# Patient Record
Sex: FEMALE | Race: WHITE | Employment: FULL TIME | URBAN - METROPOLITAN AREA
[De-identification: names, ages, dates, MRNs, and addresses within clinical notes are randomized per-mention and may not be internally consistent; named-entity substitution may affect disease eponyms.]

---

## 2019-09-07 ENCOUNTER — HOSPITAL ENCOUNTER (EMERGENCY)
Age: 39
Discharge: HOME | End: 2019-09-07
Attending: FAMILY MEDICINE
Payer: COMMERCIAL

## 2019-09-07 ENCOUNTER — APPOINTMENT (OUTPATIENT)
Dept: GENERAL RADIOLOGY | Age: 39
End: 2019-09-07
Payer: COMMERCIAL

## 2019-09-07 VITALS
TEMPERATURE: 98.2 F | WEIGHT: 170 LBS | BODY MASS INDEX: 26.68 KG/M2 | OXYGEN SATURATION: 98 % | HEIGHT: 67 IN | DIASTOLIC BLOOD PRESSURE: 62 MMHG | SYSTOLIC BLOOD PRESSURE: 104 MMHG | HEART RATE: 82 BPM | RESPIRATION RATE: 19 BRPM

## 2019-09-07 DIAGNOSIS — M54.12 CERVICAL RADICULITIS: Primary | ICD-10-CM

## 2019-09-07 LAB
ALBUMIN SERPL-MCNC: 4.4 G/DL (ref 3.5–4.6)
ALP BLD-CCNC: 60 U/L (ref 40–130)
ALT SERPL-CCNC: 15 U/L (ref 0–33)
ANION GAP SERPL CALCULATED.3IONS-SCNC: 7 MEQ/L (ref 9–15)
AST SERPL-CCNC: 12 U/L (ref 0–35)
BASOPHILS ABSOLUTE: 0.1 K/UL (ref 0–0.2)
BASOPHILS RELATIVE PERCENT: 0.9 %
BILIRUB SERPL-MCNC: 0.3 MG/DL (ref 0.2–0.7)
BUN BLDV-MCNC: 7 MG/DL (ref 6–20)
CALCIUM SERPL-MCNC: 8.7 MG/DL (ref 8.5–9.9)
CHLORIDE BLD-SCNC: 101 MEQ/L (ref 95–107)
CHOLESTEROL, TOTAL: 217 MG/DL (ref 0–199)
CO2: 29 MEQ/L (ref 20–31)
CREAT SERPL-MCNC: 0.64 MG/DL (ref 0.5–0.9)
EKG ATRIAL RATE: 76 BPM
EKG P AXIS: 58 DEGREES
EKG P-R INTERVAL: 160 MS
EKG Q-T INTERVAL: 396 MS
EKG QRS DURATION: 90 MS
EKG QTC CALCULATION (BAZETT): 445 MS
EKG R AXIS: 47 DEGREES
EKG T AXIS: 20 DEGREES
EKG VENTRICULAR RATE: 76 BPM
EOSINOPHILS ABSOLUTE: 0 K/UL (ref 0–0.7)
EOSINOPHILS RELATIVE PERCENT: 0.9 %
GFR AFRICAN AMERICAN: >60
GFR NON-AFRICAN AMERICAN: >60
GLOBULIN: 2.3 G/DL (ref 2.3–3.5)
GLUCOSE BLD-MCNC: 124 MG/DL (ref 70–99)
HBA1C MFR BLD: 5.7 % (ref 4.8–5.9)
HCT VFR BLD CALC: 37.2 % (ref 37–47)
HDLC SERPL-MCNC: 53 MG/DL (ref 40–59)
HEMOGLOBIN: 13.1 G/DL (ref 12–16)
LDL CHOLESTEROL CALCULATED: 143 MG/DL (ref 0–129)
LYMPHOCYTES ABSOLUTE: 1.7 K/UL (ref 1–4.8)
LYMPHOCYTES RELATIVE PERCENT: 29.8 %
MCH RBC QN AUTO: 29.5 PG (ref 27–31.3)
MCHC RBC AUTO-ENTMCNC: 35.2 % (ref 33–37)
MCV RBC AUTO: 83.8 FL (ref 82–100)
MONOCYTES ABSOLUTE: 0.5 K/UL (ref 0.2–0.8)
MONOCYTES RELATIVE PERCENT: 8.3 %
NEUTROPHILS ABSOLUTE: 3.3 K/UL (ref 1.4–6.5)
NEUTROPHILS RELATIVE PERCENT: 60.1 %
PDW BLD-RTO: 12.1 % (ref 11.5–14.5)
PLATELET # BLD: 208 K/UL (ref 130–400)
POTASSIUM SERPL-SCNC: 4.4 MEQ/L (ref 3.4–4.9)
PRO-BNP: 18 PG/ML
RBC # BLD: 4.44 M/UL (ref 4.2–5.4)
SODIUM BLD-SCNC: 137 MEQ/L (ref 135–144)
TOTAL CK: 63 U/L (ref 0–170)
TOTAL PROTEIN: 6.7 G/DL (ref 6.3–8)
TRIGL SERPL-MCNC: 103 MG/DL (ref 0–150)
TROPONIN: <0.01 NG/ML (ref 0–0.01)
WBC # BLD: 5.6 K/UL (ref 4.8–10.8)

## 2019-09-07 PROCEDURE — 80053 COMPREHEN METABOLIC PANEL: CPT

## 2019-09-07 PROCEDURE — 83880 ASSAY OF NATRIURETIC PEPTIDE: CPT

## 2019-09-07 PROCEDURE — 6360000002 HC RX W HCPCS: Performed by: FAMILY MEDICINE

## 2019-09-07 PROCEDURE — 96372 THER/PROPH/DIAG INJ SC/IM: CPT

## 2019-09-07 PROCEDURE — 85025 COMPLETE CBC W/AUTO DIFF WBC: CPT

## 2019-09-07 PROCEDURE — 71045 X-RAY EXAM CHEST 1 VIEW: CPT

## 2019-09-07 PROCEDURE — 82550 ASSAY OF CK (CPK): CPT

## 2019-09-07 PROCEDURE — 84484 ASSAY OF TROPONIN QUANT: CPT

## 2019-09-07 PROCEDURE — 99285 EMERGENCY DEPT VISIT HI MDM: CPT

## 2019-09-07 PROCEDURE — 80061 LIPID PANEL: CPT

## 2019-09-07 PROCEDURE — 83036 HEMOGLOBIN GLYCOSYLATED A1C: CPT

## 2019-09-07 PROCEDURE — 93005 ELECTROCARDIOGRAM TRACING: CPT | Performed by: FAMILY MEDICINE

## 2019-09-07 PROCEDURE — 36415 COLL VENOUS BLD VENIPUNCTURE: CPT

## 2019-09-07 RX ORDER — ORPHENADRINE CITRATE 30 MG/ML
60 INJECTION INTRAMUSCULAR; INTRAVENOUS ONCE
Status: COMPLETED | OUTPATIENT
Start: 2019-09-07 | End: 2019-09-07

## 2019-09-07 RX ORDER — TIZANIDINE 4 MG/1
4 TABLET ORAL EVERY 8 HOURS PRN
Qty: 10 TABLET | Refills: 0 | Status: SHIPPED | OUTPATIENT
Start: 2019-09-07 | End: 2019-10-07

## 2019-09-07 RX ORDER — KETOROLAC TROMETHAMINE 30 MG/ML
60 INJECTION, SOLUTION INTRAMUSCULAR; INTRAVENOUS ONCE
Status: COMPLETED | OUTPATIENT
Start: 2019-09-07 | End: 2019-09-07

## 2019-09-07 RX ORDER — NAPROXEN 500 MG/1
500 TABLET ORAL 2 TIMES DAILY
Qty: 60 TABLET | Refills: 0 | Status: SHIPPED | OUTPATIENT
Start: 2019-09-07 | End: 2020-03-11 | Stop reason: SDUPTHER

## 2019-09-07 RX ADMIN — ORPHENADRINE CITRATE 60 MG: 30 INJECTION INTRAMUSCULAR; INTRAVENOUS at 21:08

## 2019-09-07 RX ADMIN — KETOROLAC TROMETHAMINE 60 MG: 30 INJECTION, SOLUTION INTRAMUSCULAR; INTRAVENOUS at 21:07

## 2019-09-07 ASSESSMENT — PAIN DESCRIPTION - LOCATION: LOCATION: ARM

## 2019-09-07 ASSESSMENT — PAIN SCALES - GENERAL
PAINLEVEL_OUTOF10: 8
PAINLEVEL_OUTOF10: 8

## 2019-09-07 ASSESSMENT — PAIN DESCRIPTION - PAIN TYPE: TYPE: ACUTE PAIN

## 2019-09-07 ASSESSMENT — PAIN DESCRIPTION - ORIENTATION: ORIENTATION: LEFT

## 2019-09-08 ASSESSMENT — ENCOUNTER SYMPTOMS
BACK PAIN: 0
SHORTNESS OF BREATH: 0
NAUSEA: 0
VOMITING: 0
COUGH: 0
ABDOMINAL PAIN: 0
ORTHOPNEA: 0
TROUBLE SWALLOWING: 0
HEARTBURN: 0
RESPIRATORY NEGATIVE: 1

## 2019-09-08 ASSESSMENT — HEART SCORE: ECG: 0

## 2019-09-08 NOTE — ED NOTES
Iv catheter removed by this tech because patient is being discharged home. Catheter intact, no complications, clean dressing applied.       Cheikh Chung  09/07/19 2056

## 2019-09-09 PROCEDURE — 93010 ELECTROCARDIOGRAM REPORT: CPT | Performed by: INTERNAL MEDICINE

## 2020-01-22 ENCOUNTER — OFFICE VISIT (OUTPATIENT)
Dept: FAMILY MEDICINE CLINIC | Age: 40
End: 2020-01-22
Payer: COMMERCIAL

## 2020-01-22 VITALS
OXYGEN SATURATION: 97 % | DIASTOLIC BLOOD PRESSURE: 78 MMHG | HEART RATE: 98 BPM | WEIGHT: 172.4 LBS | SYSTOLIC BLOOD PRESSURE: 112 MMHG | BODY MASS INDEX: 27 KG/M2 | TEMPERATURE: 97.2 F

## 2020-01-22 PROCEDURE — 99204 OFFICE O/P NEW MOD 45 MIN: CPT | Performed by: INTERNAL MEDICINE

## 2020-01-22 RX ORDER — BUPROPION HYDROCHLORIDE 150 MG/1
150 TABLET ORAL EVERY MORNING
COMMUNITY
Start: 2019-10-19

## 2020-01-22 RX ORDER — ALPRAZOLAM 0.5 MG/1
0.5 TABLET ORAL 4 TIMES DAILY PRN
Qty: 28 TABLET | Refills: 0 | Status: SHIPPED | OUTPATIENT
Start: 2020-01-22 | End: 2020-03-11 | Stop reason: SDUPTHER

## 2020-01-22 RX ORDER — DEXTROAMPHETAMINE SACCHARATE, AMPHETAMINE ASPARTATE, DEXTROAMPHETAMINE SULFATE AND AMPHETAMINE SULFATE 7.5; 7.5; 7.5; 7.5 MG/1; MG/1; MG/1; MG/1
30 TABLET ORAL 2 TIMES DAILY
Qty: 30 TABLET | Refills: 0 | Status: SHIPPED | OUTPATIENT
Start: 2020-01-22 | End: 2020-03-03 | Stop reason: SDUPTHER

## 2020-01-22 RX ORDER — DEXTROAMPHETAMINE SACCHARATE, AMPHETAMINE ASPARTATE, DEXTROAMPHETAMINE SULFATE AND AMPHETAMINE SULFATE 7.5; 7.5; 7.5; 7.5 MG/1; MG/1; MG/1; MG/1
30 TABLET ORAL DAILY
Qty: 30 TABLET | Refills: 0 | Status: SHIPPED | OUTPATIENT
Start: 2020-01-22 | End: 2020-01-22

## 2020-01-22 RX ORDER — DEXTROAMPHETAMINE SACCHARATE, AMPHETAMINE ASPARTATE MONOHYDRATE, DEXTROAMPHETAMINE SULFATE AND AMPHETAMINE SULFATE 7.5; 7.5; 7.5; 7.5 MG/1; MG/1; MG/1; MG/1
30 CAPSULE, EXTENDED RELEASE ORAL DAILY
COMMUNITY
Start: 2019-11-06 | End: 2020-01-22

## 2020-01-22 RX ORDER — LAMOTRIGINE 150 MG/1
150 TABLET ORAL 3 TIMES DAILY
COMMUNITY
Start: 2019-12-13 | End: 2020-03-11 | Stop reason: SDUPTHER

## 2020-01-22 RX ORDER — OMEPRAZOLE 40 MG/1
40 CAPSULE, DELAYED RELEASE ORAL DAILY
Qty: 30 CAPSULE | Refills: 3 | Status: SHIPPED | OUTPATIENT
Start: 2020-01-22 | End: 2020-02-24 | Stop reason: SDUPTHER

## 2020-01-22 RX ORDER — ALPRAZOLAM 0.5 MG/1
0.5 TABLET ORAL 4 TIMES DAILY PRN
COMMUNITY
Start: 2019-12-31 | End: 2020-01-22 | Stop reason: SDUPTHER

## 2020-01-22 ASSESSMENT — PATIENT HEALTH QUESTIONNAIRE - PHQ9
2. FEELING DOWN, DEPRESSED OR HOPELESS: 0
SUM OF ALL RESPONSES TO PHQ QUESTIONS 1-9: 1
1. LITTLE INTEREST OR PLEASURE IN DOING THINGS: 1
SUM OF ALL RESPONSES TO PHQ QUESTIONS 1-9: 1
SUM OF ALL RESPONSES TO PHQ9 QUESTIONS 1 & 2: 1

## 2020-01-22 ASSESSMENT — ENCOUNTER SYMPTOMS
RHINORRHEA: 0
BACK PAIN: 0
BLOOD IN STOOL: 0
ABDOMINAL PAIN: 0
VOMITING: 0
SINUS PRESSURE: 0
FACIAL SWELLING: 0
APNEA: 0
EYE ITCHING: 0
ABDOMINAL DISTENTION: 0
NAUSEA: 0
COUGH: 0
WHEEZING: 0
EYE PAIN: 0
CHEST TIGHTNESS: 0
PHOTOPHOBIA: 0
CONSTIPATION: 0
COLOR CHANGE: 0
RECTAL PAIN: 0
DIARRHEA: 0
EYE DISCHARGE: 0
TROUBLE SWALLOWING: 0
SHORTNESS OF BREATH: 0
SINUS PAIN: 0
SORE THROAT: 0
EYE REDNESS: 0
VOICE CHANGE: 0

## 2020-01-22 NOTE — PROGRESS NOTES
2020    Harley Lynn (:  1980) is a 37 y.o. female, here for evaluation of the following medical concerns:    HPI  24-year-old female with a history of bipolar disorder, attention deficit disorder, GERD and anxiety presents to establish continuity with me as her primary care doctor. Attention deficit disorder: The patient has received Adderall 30 mg twice daily for several years and this is controlled her attention deficit disorder symptoms. Bipolar disorder: The patient is compliant  Lamictal.  Her symptoms are well controlled. GERD: The patient receives Prilosec 40 mg orally daily. Her symptoms are well controlled. Anxiety: As a forementioned the patient receives Wellbutrin and her symptoms are well controlled at this time. At present he denies polyuria,  Polydipsia, constitutional, sinus, visual, cardiopulmonary, urologic, gastrointestinal, immunologic/hematologic, musculoskeletal, neurologic,dermatologic, or psychiatric complaints. Review of Systems   Constitutional: Negative for chills, diaphoresis, fatigue and fever. HENT: Negative for congestion, dental problem, drooling, ear discharge, ear pain, facial swelling, hearing loss, mouth sores, nosebleeds, postnasal drip, rhinorrhea, sinus pressure, sinus pain, sneezing, sore throat, tinnitus, trouble swallowing and voice change. Eyes: Negative for photophobia, pain, discharge, redness, itching and visual disturbance. Respiratory: Negative for apnea, cough, chest tightness, shortness of breath and wheezing. Cardiovascular: Negative for chest pain, palpitations and leg swelling. Gastrointestinal: Negative for abdominal distention, abdominal pain, blood in stool, constipation, diarrhea, nausea, rectal pain and vomiting. Endocrine: Negative for cold intolerance, heat intolerance, polydipsia, polyphagia and polyuria.    Genitourinary: Negative for decreased urine volume, difficulty urinating, dysuria, flank pain, frequency, genital sores, hematuria and urgency. Musculoskeletal: Negative for arthralgias, back pain, gait problem, joint swelling, myalgias, neck pain and neck stiffness. Skin: Negative for color change, rash and wound. Allergic/Immunologic: Negative for environmental allergies and food allergies. Neurological: Negative for dizziness, tremors, seizures, syncope, facial asymmetry, speech difficulty, weakness, light-headedness, numbness and headaches. Hematological: Negative for adenopathy. Does not bruise/bleed easily. Psychiatric/Behavioral: Negative for agitation, confusion, decreased concentration, hallucinations, self-injury, sleep disturbance and suicidal ideas. The patient is not nervous/anxious. Prior to Visit Medications    Medication Sig Taking? Authorizing Provider   amphetamine-dextroamphetamine (ADDERALL XR) 30 MG extended release capsule Take 30 mg by mouth daily. Yes Historical Provider, MD   buPROPion (WELLBUTRIN XL) 150 MG extended release tablet Take 150 mg by mouth every morning  Yes Historical Provider, MD   lamoTRIgine (LAMICTAL) 150 MG tablet Take 150 mg by mouth 3 times daily Yes Historical Provider, MD   amphetamine-dextroamphetamine (ADDERALL, 30MG,) 30 MG tablet Take 1 tablet by mouth 2 times daily for 30 days. Yes Luh Herring MD   ALPRAZolam Shelba Moors) 0.5 MG tablet Take 1 tablet by mouth 4 times daily as needed for Anxiety for up to 7 days. Yes Luh Herring MD   omeprazole (PRILOSEC) 40 MG delayed release capsule Take 1 capsule by mouth daily Yes Luh Herring MD   naproxen (NAPROSYN) 500 MG tablet Take 1 tablet by mouth 2 times daily Yes Trinity Billy MD        No Known Allergies    Past Medical History:   Diagnosis Date    Anxiety     Bipolar 1 disorder ([facility]     Diabetes mellitus ([facility]     HSV-1 (herpes simplex virus 1) infection     Human papilloma virus     Hyperlipidemia        History reviewed. No pertinent surgical history.     Social General: She is not in acute distress. Appearance: She is well-developed. HENT:      Head: Normocephalic. Right Ear: External ear normal.      Left Ear: External ear normal.   Eyes:      Conjunctiva/sclera: Conjunctivae normal.      Pupils: Pupils are equal, round, and reactive to light. Neck:      Musculoskeletal: Neck supple. Vascular: No JVD. Trachea: No tracheal deviation. Cardiovascular:      Rate and Rhythm: Normal rate and regular rhythm. Heart sounds: Normal heart sounds. Pulmonary:      Effort: Pulmonary effort is normal. No respiratory distress. Breath sounds: Normal breath sounds. No wheezing or rales. Chest:      Chest wall: No tenderness. Abdominal:      General: Bowel sounds are normal. There is no distension. Palpations: Abdomen is soft. There is no mass. Tenderness: There is no tenderness. There is no guarding or rebound. Musculoskeletal:         General: No tenderness or deformity. Lymphadenopathy:      Cervical: No cervical adenopathy. Skin:     General: Skin is warm and dry. Capillary Refill: Capillary refill takes 2 to 3 seconds. Coloration: Skin is not pale. Findings: No erythema or rash. Neurological:      Mental Status: She is alert and oriented to person, place, and time. Motor: No abnormal muscle tone. Psychiatric:         Thought Content: Thought content normal.         Judgment: Judgment normal.         ASSESSMENT/PLAN:  1. Attention deficit hyperactivity disorder (ADHD), unspecified ADHD type    - amphetamine-dextroamphetamine (ADDERALL, 30MG,) 30 MG tablet; Take 1 tablet by mouth 2 times daily for 30 days. Dispense: 30 tablet; Refill: 0  - ALPRAZolam (XANAX) 0.5 MG tablet; Take 1 tablet by mouth 4 times daily as needed for Anxiety for up to 7 days. Dispense: 28 tablet; Refill: 0          2.  Type 2 diabetes mellitus with other specified complication, without long-term current use of insulin (HCC)  No interventions at this time. Reviewed metformin as an option to treat the patient's diabetes. She is concerned regarding possible side effects. Hemoglobin A1c was 5.7 on September 7, 2019. Will await patient's decision regarding the initiation of metformin. .          Lona Eddy MD, [place]      Return in about 2 weeks (around 2/5/2020). An  electronic signature was used to authenticate this note.     --Esha Rojas MD on 1/22/2020 at 3:23 PM

## 2020-02-04 ENCOUNTER — PATIENT MESSAGE (OUTPATIENT)
Dept: FAMILY MEDICINE CLINIC | Age: 40
End: 2020-02-04

## 2020-02-15 ENCOUNTER — HOSPITAL ENCOUNTER (EMERGENCY)
Age: 40
Discharge: HOME | End: 2020-02-15
Attending: EMERGENCY MEDICINE
Payer: COMMERCIAL

## 2020-02-15 VITALS
RESPIRATION RATE: 18 BRPM | TEMPERATURE: 97.4 F | HEART RATE: 104 BPM | WEIGHT: 170 LBS | SYSTOLIC BLOOD PRESSURE: 119 MMHG | DIASTOLIC BLOOD PRESSURE: 81 MMHG | BODY MASS INDEX: 25.76 KG/M2 | HEIGHT: 68 IN | OXYGEN SATURATION: 100 %

## 2020-02-15 LAB
ALBUMIN SERPL-MCNC: 4.1 G/DL (ref 3.5–4.6)
ALP BLD-CCNC: 67 U/L (ref 40–130)
ALT SERPL-CCNC: 16 U/L (ref 0–33)
AMPHETAMINE SCREEN, URINE: NORMAL
ANION GAP SERPL CALCULATED.3IONS-SCNC: 14 MEQ/L (ref 9–15)
AST SERPL-CCNC: 13 U/L (ref 0–35)
BARBITURATE SCREEN URINE: NORMAL
BASOPHILS ABSOLUTE: 0 K/UL (ref 0–0.2)
BASOPHILS RELATIVE PERCENT: 0.7 %
BENZODIAZEPINE SCREEN, URINE: NORMAL
BILIRUB SERPL-MCNC: <0.2 MG/DL (ref 0.2–0.7)
BILIRUBIN URINE: NEGATIVE
BLOOD, URINE: NEGATIVE
BUN BLDV-MCNC: 10 MG/DL (ref 6–20)
CALCIUM SERPL-MCNC: 8.9 MG/DL (ref 8.5–9.9)
CANNABINOID SCREEN URINE: NORMAL
CARBAMAZEPINE LEVEL: <2 UG/ML (ref 4–10)
CHLORIDE BLD-SCNC: 93 MEQ/L (ref 95–107)
CLARITY: CLEAR
CO2: 25 MEQ/L (ref 20–31)
COCAINE METABOLITE SCREEN URINE: NORMAL
COLOR: YELLOW
CREAT SERPL-MCNC: 0.69 MG/DL (ref 0.5–0.9)
EOSINOPHILS ABSOLUTE: 0.1 K/UL (ref 0–0.7)
EOSINOPHILS RELATIVE PERCENT: 1.6 %
ETHANOL PERCENT: NORMAL G/DL
ETHANOL: <10 MG/DL (ref 0–0.08)
GFR AFRICAN AMERICAN: >60
GFR NON-AFRICAN AMERICAN: >60
GLOBULIN: 1.9 G/DL (ref 2.3–3.5)
GLUCOSE BLD-MCNC: 170 MG/DL (ref 70–99)
GLUCOSE URINE: 100 MG/DL
HCG(URINE) PREGNANCY TEST: NEGATIVE
HCT VFR BLD CALC: 35.8 % (ref 37–47)
HEMOGLOBIN: 12.8 G/DL (ref 12–16)
KETONES, URINE: NEGATIVE MG/DL
LEUKOCYTE ESTERASE, URINE: NEGATIVE
LYMPHOCYTES ABSOLUTE: 2.3 K/UL (ref 1–4.8)
LYMPHOCYTES RELATIVE PERCENT: 33.1 %
Lab: NORMAL
MCH RBC QN AUTO: 29.8 PG (ref 27–31.3)
MCHC RBC AUTO-ENTMCNC: 35.9 % (ref 33–37)
MCV RBC AUTO: 83.1 FL (ref 82–100)
METHADONE SCREEN, URINE: NORMAL
MONOCYTES ABSOLUTE: 0.5 K/UL (ref 0.2–0.8)
MONOCYTES RELATIVE PERCENT: 7.9 %
NEUTROPHILS ABSOLUTE: 3.9 K/UL (ref 1.4–6.5)
NEUTROPHILS RELATIVE PERCENT: 56.7 %
NITRITE, URINE: NEGATIVE
OPIATE SCREEN URINE: NORMAL
OXYCODONE URINE: NORMAL
PDW BLD-RTO: 12.1 % (ref 11.5–14.5)
PH UA: 6.5 (ref 5–9)
PHENCYCLIDINE SCREEN URINE: NORMAL
PLATELET # BLD: 211 K/UL (ref 130–400)
POTASSIUM SERPL-SCNC: 3.7 MEQ/L (ref 3.4–4.9)
PROPOXYPHENE SCREEN: NORMAL
PROTEIN UA: NEGATIVE MG/DL
RBC # BLD: 4.3 M/UL (ref 4.2–5.4)
SALICYLATE, SERUM: <0.3 MG/DL (ref 15–30)
SODIUM BLD-SCNC: 132 MEQ/L (ref 135–144)
SPECIFIC GRAVITY UA: 1 (ref 1–1.03)
TOTAL CK: 71 U/L (ref 0–170)
TOTAL PROTEIN: 6 G/DL (ref 6.3–8)
TSH SERPL DL<=0.05 MIU/L-ACNC: 1.69 UIU/ML (ref 0.44–3.86)
URINE REFLEX TO CULTURE: ABNORMAL
UROBILINOGEN, URINE: 0.2 E.U./DL
WBC # BLD: 6.9 K/UL (ref 4.8–10.8)

## 2020-02-15 PROCEDURE — 99283 EMERGENCY DEPT VISIT LOW MDM: CPT

## 2020-02-15 PROCEDURE — 80156 ASSAY CARBAMAZEPINE TOTAL: CPT

## 2020-02-15 PROCEDURE — G0480 DRUG TEST DEF 1-7 CLASSES: HCPCS

## 2020-02-15 PROCEDURE — 84443 ASSAY THYROID STIM HORMONE: CPT

## 2020-02-15 PROCEDURE — 6370000000 HC RX 637 (ALT 250 FOR IP): Performed by: EMERGENCY MEDICINE

## 2020-02-15 PROCEDURE — 82550 ASSAY OF CK (CPK): CPT

## 2020-02-15 PROCEDURE — 36415 COLL VENOUS BLD VENIPUNCTURE: CPT

## 2020-02-15 PROCEDURE — 85025 COMPLETE CBC W/AUTO DIFF WBC: CPT

## 2020-02-15 PROCEDURE — 80053 COMPREHEN METABOLIC PANEL: CPT

## 2020-02-15 PROCEDURE — 84703 CHORIONIC GONADOTROPIN ASSAY: CPT

## 2020-02-15 PROCEDURE — 81003 URINALYSIS AUTO W/O SCOPE: CPT

## 2020-02-15 PROCEDURE — 80307 DRUG TEST PRSMV CHEM ANLYZR: CPT

## 2020-02-15 RX ORDER — QUETIAPINE 200 MG/1
200 TABLET, FILM COATED, EXTENDED RELEASE ORAL DAILY
COMMUNITY
End: 2020-03-11

## 2020-02-15 RX ORDER — ALPRAZOLAM 0.5 MG/1
1 TABLET ORAL ONCE
Status: COMPLETED | OUTPATIENT
Start: 2020-02-15 | End: 2020-02-15

## 2020-02-15 RX ORDER — ALPRAZOLAM 0.5 MG/1
0.5 TABLET ORAL 4 TIMES DAILY PRN
COMMUNITY
End: 2020-03-03 | Stop reason: SDUPTHER

## 2020-02-15 RX ORDER — QUETIAPINE FUMARATE 25 MG/1
200 TABLET, FILM COATED ORAL DAILY
COMMUNITY
End: 2020-02-15

## 2020-02-15 RX ADMIN — ALPRAZOLAM 1 MG: 0.5 TABLET ORAL at 11:03

## 2020-02-15 ASSESSMENT — ENCOUNTER SYMPTOMS
PHOTOPHOBIA: 0
RHINORRHEA: 0
SHORTNESS OF BREATH: 0
EYE DISCHARGE: 0
CONSTIPATION: 0
FACIAL SWELLING: 0
BLOOD IN STOOL: 0
EYE PAIN: 0
WHEEZING: 0
TROUBLE SWALLOWING: 0
BACK PAIN: 0
COLOR CHANGE: 0
COUGH: 0
VOMITING: 0
DIARRHEA: 0
STRIDOR: 0
SINUS PRESSURE: 0
SORE THROAT: 0
CHEST TIGHTNESS: 0
EYE ITCHING: 0
CHOKING: 0
ANAL BLEEDING: 0
ABDOMINAL PAIN: 0
EYE REDNESS: 0
NAUSEA: 0
ABDOMINAL DISTENTION: 0
VOICE CHANGE: 0

## 2020-02-15 NOTE — ED NOTES
Collateral:patient was brought in by Brandie Schultz who gave the following collateral information. He and Venancio Logan moved back to his hometown of [place] 6 months ago from [place]. Pt has had two previous issues with Laura and was hospitalized for at least one of those episodes(the first) which occurred in 2008. Edyta has not seen a major episode in the Past. Patient was to be seen Dr Betty Medina for her first meeting earlier this week (moved up due to patient condition) and pt refused to attend. Patient works as a  and has been under additional stress. When this occurred in the past also triggered those two afore mentioned major manic episodes. She has been posting odd statements on Linked in and on text, and making odd statements in her place of business per Yuri Ruffin. She has been making statements such as , \" a  sent me an E-mail of  how happy he is with me, He is going to make me \" and a \" million dollars is going to be deposited into our account: and 'the  is trying to reach me to see what kind of jet I would like\". Edyta states she has been resistant to coming into the ER until this AM, because yesterday I came home and there was poop on the floor,she said she past out while on th toilet. [facility] [place], [address] [place] [address]  02/15/20 200 [facility], [address] [place] [address]  02/15/20 1722

## 2020-02-15 NOTE — ED NOTES
Spoke with Lab . They are aware that tegretol level was discontinued in computer even before drawn and should not have been run. They state they see the same and note will be put in to credit the account on Monday. Charlotte Bertrand  [facility]  02/15/20 [id number]

## 2020-02-15 NOTE — ED TRIAGE NOTES
Patient recently moved from [place]. Where she was being treated with Seroquel 25 mg. Began to exhibit symptoms of Laura. [place] psychiatrist notified.  He called in per triage nurse, a prescription of seroquel for 200 mg which she took this AM.

## 2020-02-15 NOTE — ED NOTES
All personal belongings returned to patient. Pt verbalized understanding. ambulated off the unit with steady gait. Varun Payne  02/15/20 2942

## 2020-02-15 NOTE — ED PROVIDER NOTES
QUETIAPINE (SEROQUEL XR) 200 MG EXTENDED RELEASE TABLET    Take 200 mg by mouth daily daily       ALLERGIES     Patient has no known allergies. FAMILY HISTORY       Family History   Problem Relation Age of Onset    Diabetes Mother     Diabetes Father     Ulcerative Colitis Brother     Bipolar Disorder Maternal Grandmother           SOCIAL HISTORY       Social History     Socioeconomic History    Marital status: Single     Spouse name: None    Number of children: None    Years of education: None    Highest education level: None   Occupational History    None   Social Needs    Financial resource strain: None    Food insecurity:     Worry: None     Inability: None    Transportation needs:     Medical: None     Non-medical: None   Tobacco Use    Smoking status: Never Smoker    Smokeless tobacco: Never Used   Substance and Sexual Activity    Alcohol use: Yes     Comment: drinks wine    Drug use: Yes     Types: Marijuana    Sexual activity: Never   Lifestyle    Physical activity:     Days per week: None     Minutes per session: None    Stress: None   Relationships    Social connections:     Talks on phone: None     Gets together: None     Attends Moravian service: None     Active member of club or organization: None     Attends meetings of clubs or organizations: None     Relationship status: None    Intimate partner violence:     Fear of current or ex partner: None     Emotionally abused: None     Physically abused: None     Forced sexual activity: None   Other Topics Concern    None   Social History Narrative    None       SCREENINGS             PHYSICAL EXAM    (up to 7 for level 4, 8 or more for level 5)     ED Triage Vitals [02/15/20 0829]   BP Temp Temp Source Pulse Resp SpO2 Height Weight   119/81 97.4 °F (36.3 °C) Oral 116 18 100 % 5' 8\" (1.727 m) 170 lb (77.1 kg)       Physical Exam  Vitals signs and nursing note reviewed. Constitutional:       General: She is not in acute distress. medically cleared for psychiatric evaluation. Patient states she is not suicidal to myself and to the [facility]. After medication, the patient states she feels much better and wants to go home she was discharged to the care of a sober adult male    [facility]      CRITICAL CARE TIME     CONSULTS:  None    PROCEDURES:  Unless otherwise noted below, none     Procedures    FINAL IMPRESSION      1. Adjustment disorder, unspecified type          DISPOSITION/PLAN   DISPOSITION Decision To Discharge 02/15/2020 12:57:28 PM      PATIENT REFERRED TO:  Nataly Begum MD  [address] [place] [phone]    Go in 2 days  For follow up. Return if worse in any way.       DISCHARGE MEDICATIONS:  New Prescriptions    No medications on file          (Please note that portions of this note were completed with a voice recognition program.  Efforts were made to edit the dictations but occasionally words are mis-transcribed.)    Christine Dailey MD (electronically signed)  Attending Emergency Physician          Christine Dailey MD  02/15/20 1300

## 2020-02-15 NOTE — ED NOTES
Provisional Diagnosis: bipolar by hx, R/o benzo/amphetamine withdrawal      Psychosocial and Contextual Factors:  Pt moved from [place] to [place] with vish about 6 months ago. She is employed with a [employer] and has been under added stress at work, She has been having increased stress at work. 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       C-SSRS Summary:     Patient: C-SSRS Suicide Screening  1) Within the past month, have you wished you were dead or wished you could go to sleep and not wake up? : No  2) Have you actually had any thoughts of killing yourself? : No  6) Have you ever done anything, started to do anything, or prepared to do anything to end your life?: No    Family: Sherry Tavares is concerned and feels she needs to be seen for an outpatient appointment but denies the need for inpatient. He is adament she has been taking all of her medications as ordred. Agency: Has an upcoming appointment with Dr Severo Spotted. Abuse Assessment  Physical Abuse: Denies  Verbal Abuse: Denies  Emotional abuse: Denies  Financial Abuse: Denies  Sexual abuse: Denies    Clinical Summary:  Pt brought in by vish for above noted reasons( see psychosocial as well as collateral note of [phone] [id number]). Pt is disorganized in thought process and is able to describe this as , \" my brain feels cluttered\".   She is not able to see however that this may be able to cause her to be delusional. \" no, it just makes me not able to express what is happening in a way that it looks delusional because this would make my lifestyle more like his (the bank presidents) not like mine is and it is so different and privilege that no one understand and thinks I am delusional. Pt statements however are not cohesive and responses to the same question being asked  More than once often differed. She is oriented to person place and time. SHe denies thougths of self harm now and in the past. She denies thoughts of harming others. She denies presence of hallucinations and no signs of same are noted. Per Dr Dr Carlos Manuel Hansen.  Annalise Yang  02/15/20 4707

## 2020-02-15 NOTE — ED NOTES
Pt is requesting to be discharged and to f/u with Dr Onel Cox. Fiance is also insistent that pt is taking meds as ordered and is safe for discharged. Pt is not meeting criteria for pink sheet. Dr Leon Shelton states understanding an agrees. pt to be discharged. Boyfriend is in route. Janelle Manzano  02/15/20 1716

## 2020-02-17 ENCOUNTER — PATIENT MESSAGE (OUTPATIENT)
Dept: FAMILY MEDICINE CLINIC | Age: 40
End: 2020-02-17

## 2020-02-24 RX ORDER — OMEPRAZOLE 40 MG/1
40 CAPSULE, DELAYED RELEASE ORAL DAILY
Qty: 30 CAPSULE | Refills: 3 | Status: SHIPPED | OUTPATIENT
Start: 2020-02-24 | End: 2022-08-11 | Stop reason: ALTCHOICE

## 2020-02-24 NOTE — TELEPHONE ENCOUNTER
Pharmacy is requesting medication refill. Please approve or deny this request.    Rx requested:  Requested Prescriptions     Pending Prescriptions Disp Refills    omeprazole (PRILOSEC) 40 MG delayed release capsule 30 capsule 3     Sig: Take 1 capsule by mouth daily         Last Office Visit:   1/22/2020      Next Visit Date:  No future appointments.

## 2020-03-03 ENCOUNTER — OFFICE VISIT (OUTPATIENT)
Dept: FAMILY MEDICINE CLINIC | Age: 40
End: 2020-03-03
Payer: COMMERCIAL

## 2020-03-03 VITALS
DIASTOLIC BLOOD PRESSURE: 72 MMHG | TEMPERATURE: 96.2 F | BODY MASS INDEX: 26.24 KG/M2 | WEIGHT: 172.6 LBS | OXYGEN SATURATION: 96 % | SYSTOLIC BLOOD PRESSURE: 108 MMHG | HEART RATE: 104 BPM

## 2020-03-03 PROCEDURE — 99213 OFFICE O/P EST LOW 20 MIN: CPT | Performed by: INTERNAL MEDICINE

## 2020-03-03 RX ORDER — DEXTROAMPHETAMINE SACCHARATE, AMPHETAMINE ASPARTATE, DEXTROAMPHETAMINE SULFATE AND AMPHETAMINE SULFATE 7.5; 7.5; 7.5; 7.5 MG/1; MG/1; MG/1; MG/1
30 TABLET ORAL 2 TIMES DAILY
Qty: 30 TABLET | Refills: 0 | Status: SHIPPED | OUTPATIENT
Start: 2020-03-03 | End: 2020-04-27 | Stop reason: SDUPTHER

## 2020-03-03 RX ORDER — VALACYCLOVIR HYDROCHLORIDE 1 G/1
1000 TABLET, FILM COATED ORAL 3 TIMES DAILY
Qty: 21 TABLET | Refills: 0 | Status: SHIPPED | OUTPATIENT
Start: 2020-03-03 | End: 2020-12-22

## 2020-03-03 RX ORDER — ALPRAZOLAM 0.5 MG/1
0.5 TABLET ORAL 4 TIMES DAILY PRN
Qty: 28 TABLET | Refills: 0 | Status: SHIPPED | OUTPATIENT
Start: 2020-03-03 | End: 2020-03-10

## 2020-03-03 ASSESSMENT — ENCOUNTER SYMPTOMS
RHINORRHEA: 0
DIARRHEA: 0
VOICE CHANGE: 0
SHORTNESS OF BREATH: 0
NAUSEA: 0
BLOOD IN STOOL: 0
SINUS PAIN: 0
PHOTOPHOBIA: 0
ABDOMINAL DISTENTION: 0
EYE PAIN: 0
CHEST TIGHTNESS: 0
EYE DISCHARGE: 0
SORE THROAT: 0
VOMITING: 0
FACIAL SWELLING: 0
COUGH: 0
WHEEZING: 0
RECTAL PAIN: 0
BACK PAIN: 0
CONSTIPATION: 0
TROUBLE SWALLOWING: 0
APNEA: 0
ABDOMINAL PAIN: 0
SINUS PRESSURE: 0
EYE REDNESS: 0
COLOR CHANGE: 0
EYE ITCHING: 0

## 2020-03-03 NOTE — PROGRESS NOTES
96.2 °F (35.7 °C)   TempSrc: Temporal   SpO2: 96%   Weight: 172 lb 9.6 oz (78.3 kg)     Estimated body mass index is 26.24 kg/m² as calculated from the following:    Height as of 2/15/20: 5' 8\" (1.727 m). Weight as of this encounter: 172 lb 9.6 oz (78.3 kg). Physical Exam  Constitutional:       General: She is not in acute distress. Appearance: She is well-developed. HENT:      Head: Normocephalic. Right Ear: External ear normal.      Left Ear: External ear normal.   Eyes:      Conjunctiva/sclera: Conjunctivae normal.      Pupils: Pupils are equal, round, and reactive to light. Neck:      Musculoskeletal: Neck supple. Vascular: No JVD. Trachea: No tracheal deviation. Cardiovascular:      Rate and Rhythm: Normal rate and regular rhythm. Heart sounds: Normal heart sounds. Pulmonary:      Effort: Pulmonary effort is normal. No respiratory distress. Breath sounds: Normal breath sounds. No wheezing or rales. Chest:      Chest wall: No tenderness. Abdominal:      General: Bowel sounds are normal. There is no distension. Palpations: Abdomen is soft. There is no mass. Tenderness: There is no abdominal tenderness. There is no guarding or rebound. Musculoskeletal:         General: No tenderness or deformity. Lymphadenopathy:      Cervical: No cervical adenopathy. Skin:     General: Skin is warm and dry. Capillary Refill: Capillary refill takes 2 to 3 seconds. Coloration: Skin is not pale. Findings: No erythema or rash. Neurological:      Mental Status: She is alert and oriented to person, place, and time. Motor: No abnormal muscle tone. Psychiatric:         Thought Content: Thought content normal.         Judgment: Judgment normal.         ASSESSMENT/PLAN:     Attention deficit hyperactivity disorder (ADHD), unspecified ADHD type    Continue Adderall.   Oars report reviewed      Type 2 diabetes mellitus with other specified complication, without long-term current use of insulin (HCC)  No interventions at this time. Reviewed metformin as an option to treat the patient's diabetes in the past.        Anxiety  I have referred the patient to psychiatry in the past.  The patient reports that she is looking for a psychiatrist.  Continue alprazolam Wellbutrin and Seroquel. No follow-ups on file. An  electronic signature was used to authenticate this note.     --Lady Marcelino MD on 3/3/2020 at 8:32 PM

## 2020-03-04 ENCOUNTER — TELEPHONE (OUTPATIENT)
Dept: FAMILY MEDICINE CLINIC | Age: 40
End: 2020-03-04

## 2020-03-11 ENCOUNTER — OFFICE VISIT (OUTPATIENT)
Dept: FAMILY MEDICINE CLINIC | Age: 40
End: 2020-03-11
Payer: COMMERCIAL

## 2020-03-11 VITALS
SYSTOLIC BLOOD PRESSURE: 112 MMHG | DIASTOLIC BLOOD PRESSURE: 72 MMHG | TEMPERATURE: 96 F | BODY MASS INDEX: 26 KG/M2 | HEART RATE: 96 BPM | OXYGEN SATURATION: 98 % | WEIGHT: 171 LBS

## 2020-03-11 PROCEDURE — 99214 OFFICE O/P EST MOD 30 MIN: CPT | Performed by: INTERNAL MEDICINE

## 2020-03-11 RX ORDER — ALPRAZOLAM 0.5 MG/1
0.5 TABLET ORAL 4 TIMES DAILY PRN
Qty: 28 TABLET | Refills: 0 | Status: SHIPPED | OUTPATIENT
Start: 2020-03-11 | End: 2020-03-17 | Stop reason: SDUPTHER

## 2020-03-11 RX ORDER — QUETIAPINE FUMARATE 25 MG/1
25 TABLET, FILM COATED ORAL NIGHTLY PRN
Qty: 60 TABLET | Refills: 0 | DISCHARGE
Start: 2020-03-11 | End: 2020-04-08 | Stop reason: SDUPTHER

## 2020-03-11 RX ORDER — LAMOTRIGINE 150 MG/1
150 TABLET ORAL 3 TIMES DAILY
Qty: 90 TABLET | Refills: 0 | Status: SHIPPED | OUTPATIENT
Start: 2020-03-11 | End: 2020-04-08

## 2020-03-11 RX ORDER — NAPROXEN 500 MG/1
500 TABLET ORAL 2 TIMES DAILY
Qty: 60 TABLET | Refills: 0 | Status: SHIPPED | OUTPATIENT
Start: 2020-03-11 | End: 2020-04-08

## 2020-03-11 ASSESSMENT — ENCOUNTER SYMPTOMS
VOMITING: 0
EYE REDNESS: 0
APNEA: 0
TROUBLE SWALLOWING: 0
WHEEZING: 0
COLOR CHANGE: 0
SINUS PAIN: 0
CONSTIPATION: 0
EYE DISCHARGE: 0
VOICE CHANGE: 0
EYE ITCHING: 0
SORE THROAT: 0
FACIAL SWELLING: 0
RHINORRHEA: 0
BACK PAIN: 0
SINUS PRESSURE: 0
COUGH: 0
PHOTOPHOBIA: 0
CHEST TIGHTNESS: 0
ABDOMINAL PAIN: 0
RECTAL PAIN: 0
NAUSEA: 0
DIARRHEA: 0
ABDOMINAL DISTENTION: 0
EYE PAIN: 0
SHORTNESS OF BREATH: 0
BLOOD IN STOOL: 0

## 2020-03-11 NOTE — PROGRESS NOTES
3/11/2020    Berto Botello (:  1980) is a 37 y.o. female, here for evaluation of the following medical concerns:  Bipolar disorder and attention deficit disorder. The patient is a 24-year-old female with a history of bipolar disorder, attention deficit disorder, GERD and anxiety presents for follow-up visit. Attention deficit disorder: The patient is compliant with Adderall 30 mg twice daily and this is controlling her attention deficit disorder symptoms. Bipolar disorder: The patient is compliant  Lamictal.  Her symptoms have been controlled over the course of the past week      GERD: The patient receives Prilosec 40 mg orally daily. Her symptoms are well controlled. Anxiety: The patient is compliant with Wellbutrin and Xanax twice daily. Her symptoms are well controlled at this time. Type 2 diabetes: The patient was diagnosed with type 2 diabetes several years ago. To date she has not received pharmacologic treatments. At present he denies polyuria,  Polydipsia, constitutional, sinus, visual, cardiopulmonary, urologic, gastrointestinal, immunologic/hematologic, musculoskeletal, neurologic,dermatologic, or psychiatric complaints. Review of Systems   Constitutional: Negative for chills, diaphoresis, fatigue and fever. HENT: Negative for congestion, dental problem, drooling, ear discharge, ear pain, facial swelling, hearing loss, mouth sores, nosebleeds, postnasal drip, rhinorrhea, sinus pressure, sinus pain, sneezing, sore throat, tinnitus, trouble swallowing and voice change. Eyes: Negative for photophobia, pain, discharge, redness, itching and visual disturbance. Respiratory: Negative for apnea, cough, chest tightness, shortness of breath and wheezing. Cardiovascular: Negative for chest pain, palpitations and leg swelling.    Gastrointestinal: Negative for abdominal distention, abdominal pain, blood in stool, constipation, diarrhea, nausea, rectal pain and vomiting. Endocrine: Negative for cold intolerance, heat intolerance, polydipsia, polyphagia and polyuria. Genitourinary: Negative for decreased urine volume, difficulty urinating, dysuria, flank pain, frequency, genital sores, hematuria and urgency. Musculoskeletal: Negative for arthralgias, back pain, gait problem, joint swelling, myalgias, neck pain and neck stiffness. Skin: Negative for color change, rash and wound. Allergic/Immunologic: Negative for environmental allergies and food allergies. Neurological: Negative for dizziness, tremors, seizures, syncope, facial asymmetry, speech difficulty, weakness, light-headedness, numbness and headaches. Hematological: Negative for adenopathy. Does not bruise/bleed easily. Psychiatric/Behavioral: Negative for agitation, confusion, decreased concentration, hallucinations, self-injury, sleep disturbance and suicidal ideas. The patient is not nervous/anxious. Prior to Visit Medications    Medication Sig Taking? Authorizing Provider   lamoTRIgine (LAMICTAL) 150 MG tablet Take 1 tablet by mouth 3 times daily Yes Mamie Lara MD   ALPRAZolam Myla Julio) 0.5 MG tablet Take 1 tablet by mouth 4 times daily as needed for Anxiety for up to 7 days. Yes Mamie Lara MD   QUEtiapine (SEROQUEL) 25 MG tablet Take 1 tablet by mouth nightly as needed for Agitation Yes Mamie Lara MD   naproxen (NAPROSYN) 500 MG tablet Take 1 tablet by mouth 2 times daily Yes Mamie Lara MD   amphetamine-dextroamphetamine (ADDERALL, 30MG,) 30 MG tablet Take 1 tablet by mouth 2 times daily for 30 days.  Yes Mamie Lara MD   omeprazole (PRILOSEC) 40 MG delayed release capsule Take 1 capsule by mouth daily Yes Mamie Lara MD   buPROPion (WELLBUTRIN XL) 150 MG extended release tablet Take 150 mg by mouth every morning  Yes Historical Provider, MD        No Known Allergies    Past Medical History:   Diagnosis Date    Anxiety     B[facility] 1 disorder ([facility] [address]  Diabetes mellitus ([facility] [address] [facility]     HSV-1 (herpes simplex virus 1) infection     Human papilloma virus     Hyperlipidemia        No past surgical history on file.     Social History     Socioeconomic History    Marital status: Single     Spouse name: Not on file    Number of children: Not on file    Years of education: Not on file    Highest education level: Not on file   Occupational History    Not on file   Social Needs    Financial resource strain: Not on file    Food insecurity     Worry: Not on file     Inability: Not on file    Transportation needs     Medical: Not on file     Non-medical: Not on file   Tobacco Use    Smoking status: Never Smoker    Smokeless tobacco: Never Used   Substance and Sexual Activity    Alcohol use: Yes     Comment: drinks wine    Drug use: Yes     Types: Marijuana    Sexual activity: Never   Lifestyle    Physical activity     Days per week: Not on file     Minutes per session: Not on file    Stress: Not on file   Relationships    Social connections     Talks on phone: Not on file     Gets together: Not on file     Attends Sikhism service: Not on file     Active member of club or organization: Not on file     Attends meetings of clubs or organizations: Not on file     Relationship status: Not on file    Intimate partner violence     Fear of current or ex partner: Not on file     Emotionally abused: Not on file     Physically abused: Not on file     Forced sexual activity: Not on file   Other Topics Concern    Not on file   Social History Narrative    Not on file        Family History   Problem Relation Age of Onset    Diabetes Mother     Diabetes Father     Ulcerative Colitis Brother     Bipolar Disorder Maternal Grandmother        Vitals:    03/11/20 1551   BP: 112/72   Site: Left Upper Arm   Position: Sitting   Cuff Size: Large Adult   Pulse: 96   Temp: 96 °F (35.6 °C)   TempSrc: Temporal   SpO2: 98%   Weight: 171 lb (77.6 kg)     Estimated body mass index is Lamictal      Anxiety  I have referred the patient to psychiatry in the past.  The patient reports that she is looking for a psychiatrist.  Continue alprazolam Wellbutrin and Seroquel. I will implement recommendations as a pertains to these medications per psychiatry recommendations. Return in about 2 weeks (around 3/25/2020). An  electronic signature was used to authenticate this note.     --Toñito Chambers MD on 3/11/2020 at 6:14 PM

## 2020-03-17 ENCOUNTER — TELEPHONE (OUTPATIENT)
Dept: FAMILY MEDICINE CLINIC | Age: 40
End: 2020-03-17

## 2020-03-17 RX ORDER — ALPRAZOLAM 0.5 MG/1
0.5 TABLET ORAL 4 TIMES DAILY PRN
Qty: 28 TABLET | Refills: 0 | Status: SHIPPED | OUTPATIENT
Start: 2020-03-17 | End: 2020-04-07 | Stop reason: SDUPTHER

## 2020-03-17 RX ORDER — PAROXETINE 10 MG/1
10 TABLET, FILM COATED ORAL DAILY
Qty: 16 TABLET | Refills: 3 | Status: SHIPPED | OUTPATIENT
Start: 2020-03-17 | End: 2020-03-25

## 2020-03-25 ENCOUNTER — PATIENT MESSAGE (OUTPATIENT)
Dept: FAMILY MEDICINE CLINIC | Age: 40
End: 2020-03-25

## 2020-03-25 ENCOUNTER — VIRTUAL VISIT (OUTPATIENT)
Dept: FAMILY MEDICINE CLINIC | Age: 40
End: 2020-03-25
Payer: COMMERCIAL

## 2020-03-25 PROCEDURE — 99213 OFFICE O/P EST LOW 20 MIN: CPT | Performed by: INTERNAL MEDICINE

## 2020-03-25 ASSESSMENT — ENCOUNTER SYMPTOMS
COLOR CHANGE: 0
COUGH: 0
APNEA: 0
RECTAL PAIN: 0
CONSTIPATION: 0
VOMITING: 0
PHOTOPHOBIA: 0
SHORTNESS OF BREATH: 0
SORE THROAT: 0
DIARRHEA: 0
EYE REDNESS: 0
TROUBLE SWALLOWING: 0
NAUSEA: 0
WHEEZING: 0
EYE ITCHING: 0
FACIAL SWELLING: 0
VOICE CHANGE: 0
CHEST TIGHTNESS: 0
ABDOMINAL PAIN: 0
BLOOD IN STOOL: 0
BACK PAIN: 0
SINUS PAIN: 0
EYE PAIN: 0
EYE DISCHARGE: 0
SINUS PRESSURE: 0
RHINORRHEA: 0
ABDOMINAL DISTENTION: 0

## 2020-03-25 NOTE — TELEPHONE ENCOUNTER
From: Gary Pugh  To: Priyanka Aguilera MD  Sent: 3/25/2020 11:04 AM EDT  Subject: Non-Urgent Medical Question    Hi, I didnt realize I had an appointment are we doing a video appointment today at 445pm?

## 2020-03-25 NOTE — PROGRESS NOTES
3/25/2020    Jayden Cottrell (:  1980) is a 37 y.o. female, here for evaluation of the following medical concerns:  Bipolar disorder and attention deficit disorder. This telephone encounter is being conducted to reduce the spread of coronavirus and reduce the morbidity and mortality risk of exposure related to the virus. The patient is a 64-year-old female with a history of bipolar disorder, attention deficit disorder, GERD and anxiety presents for follow-up visit. Attention deficit disorder: The patient is compliant with Adderall 30 mg twice daily and this is controlling her attention deficit disorder symptoms. Bipolar disorder: The patient's bipolar disorder remains under good control with the patient is compliant with Lamictal      GERD: The patient receives Prilosec 40 mg orally daily. Her symptoms are well controlled. Anxiety: The patient is compliant with Wellbutrin and Xanax twice daily. Her symptoms are well controlled at this time. Type 2 diabetes: The patient was diagnosed with type 2 diabetes several years ago. To date she has not received pharmacologic treatments. She requests initiation of metformin at this time. At present he denies polyuria,  Polydipsia, constitutional, sinus, visual, cardiopulmonary, urologic, gastrointestinal, immunologic/hematologic, musculoskeletal, neurologic,dermatologic, or psychiatric complaints. Review of Systems   Constitutional: Negative for chills, diaphoresis, fatigue and fever. HENT: Negative for congestion, dental problem, drooling, ear discharge, ear pain, facial swelling, hearing loss, mouth sores, nosebleeds, postnasal drip, rhinorrhea, sinus pressure, sinus pain, sneezing, sore throat, tinnitus, trouble swallowing and voice change. Eyes: Negative for photophobia, pain, discharge, redness, itching and visual disturbance.    Respiratory: Negative for apnea, cough, chest tightness, shortness of breath and

## 2020-03-29 ENCOUNTER — PATIENT MESSAGE (OUTPATIENT)
Dept: FAMILY MEDICINE CLINIC | Age: 40
End: 2020-03-29

## 2020-03-30 NOTE — TELEPHONE ENCOUNTER
From: Dori Grajeda  To: Enzo Lieberman MD  Sent: 3/29/2020 8:49 AM EDT  Subject: Prescription Question    I need a refill on the alorazolam .5mg Im down to a tablet.

## 2020-03-31 RX ORDER — LORAZEPAM 0.5 MG/1
0.5 TABLET ORAL EVERY 6 HOURS PRN
Qty: 28 TABLET | Refills: 0 | Status: SHIPPED | OUTPATIENT
Start: 2020-03-31 | End: 2020-04-07

## 2020-04-06 ENCOUNTER — TELEPHONE (OUTPATIENT)
Dept: FAMILY MEDICINE CLINIC | Age: 40
End: 2020-04-06

## 2020-04-06 NOTE — TELEPHONE ENCOUNTER
YOUR FAMILY HISTORY:  Has anyone in  your family been diagnosed with a mental health condition like depression, anxiety, schizophrenia, bipolar disorder or substance abuse? MOM DX ANXIETY    Has anyone in your family committed suicide? NO    Has anyone in your family  of an unexplained cause or cardiac problems younger than 48years old. NO    Do you have any heart problems? {NO    THE FOLLOWING IS REGARDING YOUR PERSONAL HISTORY:  With whom do you live? BOYFRIEND    What is the highest level of education you have received? COLLEGE DEGREE BA     Are you  or ? NO    Are you currently employed or on disability? FULL TIME EMPLOYED     Do you have a Hindu preference? Caodaism      Do you have any current thoughts of harming yourself?  NO   If yes, advise patient to proceed to emergency room    Is there any paperwork you will be bringing to appointment?  NO    Please advise patient that generally appointment will be one hour long (plus half hour early for filling out intake) and paperwork needs to be given at least one week in advance, will likely require a second appointment for completing paperwork like FMLA, no guarantee that other paperwork will be completed during first session    Informed patient: please arrive early to your appointment, there will be some paperwork for you to complete prior to your appointment. An appointment should last an hour but please be prepared to wait for up to 1/2-hour and schedule ample time to return to work after your appointment. Please bring any relevant paperwork from your previous psychiatric appointments/assessments with you to your first appointment. You may also fax this paperwork to [employer] at [phone].

## 2020-04-07 RX ORDER — ALPRAZOLAM 0.5 MG/1
0.5 TABLET ORAL 4 TIMES DAILY PRN
Qty: 28 TABLET | Refills: 0 | Status: SHIPPED | OUTPATIENT
Start: 2020-04-07 | End: 2020-04-14

## 2020-04-08 ENCOUNTER — VIRTUAL VISIT (OUTPATIENT)
Dept: BEHAVIORAL/MENTAL HEALTH CLINIC | Age: 40
End: 2020-04-08
Payer: COMMERCIAL

## 2020-04-08 PROBLEM — F41.1 GAD (GENERALIZED ANXIETY DISORDER): Status: ACTIVE | Noted: 2020-04-08

## 2020-04-08 PROBLEM — F41.1 GAD (GENERALIZED ANXIETY DISORDER): Status: RESOLVED | Noted: 2020-04-08 | Resolved: 2020-04-08

## 2020-04-08 PROCEDURE — 90792 PSYCH DIAG EVAL W/MED SRVCS: CPT | Performed by: PSYCHIATRY & NEUROLOGY

## 2020-04-08 RX ORDER — CLONIDINE HYDROCHLORIDE 0.1 MG/1
0.1 TABLET ORAL 2 TIMES DAILY
Qty: 60 TABLET | Refills: 2 | Status: SHIPPED | OUTPATIENT
Start: 2020-04-08 | End: 2020-06-08

## 2020-04-08 RX ORDER — QUETIAPINE FUMARATE 25 MG/1
25 TABLET, FILM COATED ORAL NIGHTLY PRN
Qty: 30 TABLET | Refills: 1 | Status: SHIPPED | OUTPATIENT
Start: 2020-04-08 | End: 2020-06-08

## 2020-04-08 RX ORDER — LAMOTRIGINE 150 MG/1
150 TABLET ORAL 2 TIMES DAILY
Qty: 60 TABLET | Refills: 2 | Status: SHIPPED | OUTPATIENT
Start: 2020-04-08 | End: 2020-05-05

## 2020-04-08 RX ORDER — LAMOTRIGINE 150 MG/1
TABLET ORAL
Qty: 90 TABLET | Refills: 0 | Status: SHIPPED | OUTPATIENT
Start: 2020-04-08 | End: 2020-04-08 | Stop reason: SDUPTHER

## 2020-04-08 RX ORDER — GABAPENTIN 100 MG/1
200 CAPSULE ORAL 3 TIMES DAILY
Qty: 180 CAPSULE | Refills: 0 | Status: SHIPPED | OUTPATIENT
Start: 2020-04-08 | End: 2020-06-07 | Stop reason: SDUPTHER

## 2020-04-08 RX ORDER — NAPROXEN 500 MG/1
TABLET ORAL
Qty: 60 TABLET | Refills: 0 | Status: SHIPPED | OUTPATIENT
Start: 2020-04-08 | End: 2022-08-11 | Stop reason: ALTCHOICE

## 2020-04-08 NOTE — PROGRESS NOTES
4/8/2020    TELEHEALTH EVALUATION -- Audio/Visual (During IYMIT-96 public health emergency)    Due to [place] outbreak, patient's office visit was converted to a virtual visit. Patient was contacted and agreed to proceed with a virtual visit via [employer]. me  The risks and benefits of converting to a virtual visit were discussed in light of the current infectious disease epidemic. Patient also understood that insurance coverage and co-pays are up to their individual insurance plans. Patient Location:        Patient's home address    Provider Location ([facility]):        [facility]    History of Present Illness    Venu Bates is a 37 y.o. female with a history significant for anxiety and depression that has worsened over the past several years. Pt moved from [place] about 6 or 7 months ago. Pt has been working with Dr. Alton Hoyos. 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 Stressors: not having xanax    Greatest source of support is     Social History:  Childhood:\"fine\"  Psychological trauma or Abuse: denies  Lives at home in own house  Moved from [place] 6 months ago    Past Psychiatric History (over the past 6 months, unless otherwise specified):  Previous diagnoses/symptoms: Patient reports she was previously diagnosed with bipolar. She does report history of previous suicide attempts that was remote. States that she is not in therapy. Previous therapy: States that she had a therapist in [place]  Self-injurious behavior/risky thoughts or behaviors (past suicidal ideation/attempt): History of suicide attempts but patient says this was many years ago.   Violence/Risk to others (past homicidal ideation/attempt): Denies  Current psychiatric provider: Patient reports that she had a psychiatrist in [place]  Previous psychiatric medication trials: paxil, seroquel, wellbutrin, lamictal, ativan, xanax   Previous psychiatric hospitalizations: Patient was diagnosed for a manic episode in 2008

## 2020-04-28 RX ORDER — DEXTROAMPHETAMINE SACCHARATE, AMPHETAMINE ASPARTATE, DEXTROAMPHETAMINE SULFATE AND AMPHETAMINE SULFATE 7.5; 7.5; 7.5; 7.5 MG/1; MG/1; MG/1; MG/1
30 TABLET ORAL 2 TIMES DAILY
Qty: 30 TABLET | Refills: 0 | Status: SHIPPED | OUTPATIENT
Start: 2020-04-28 | End: 2020-05-14 | Stop reason: SDUPTHER

## 2020-05-05 RX ORDER — LAMOTRIGINE 150 MG/1
TABLET ORAL
Qty: 90 TABLET | Refills: 0 | Status: SHIPPED | OUTPATIENT
Start: 2020-05-05

## 2020-05-14 RX ORDER — DEXTROAMPHETAMINE SACCHARATE, AMPHETAMINE ASPARTATE, DEXTROAMPHETAMINE SULFATE AND AMPHETAMINE SULFATE 7.5; 7.5; 7.5; 7.5 MG/1; MG/1; MG/1; MG/1
30 TABLET ORAL 2 TIMES DAILY
Qty: 60 TABLET | Refills: 0 | Status: SHIPPED | OUTPATIENT
Start: 2020-05-14 | End: 2020-07-10 | Stop reason: SDUPTHER

## 2020-06-05 ENCOUNTER — TELEPHONE (OUTPATIENT)
Dept: FAMILY MEDICINE CLINIC | Age: 40
End: 2020-06-05

## 2020-06-05 RX ORDER — GABAPENTIN 100 MG/1
200 CAPSULE ORAL 3 TIMES DAILY
Qty: 180 CAPSULE | Refills: 0 | OUTPATIENT
Start: 2020-06-05 | End: 2020-07-05

## 2020-06-05 NOTE — TELEPHONE ENCOUNTER
REFILL REQUEST UPDATE FROM PHYSICIAN    Shyann Manning,     I got a refill request for the gabapentin. However, in our system it looks like you just got a fill on the Xanax prescription on June 1st from Dr. Sinan Chris. I would advise reaching out to Dr. Sinan Chris or calling about the gabapentin  since he is prescribing xanax, and those two medications are essentially best prescribed by the same psychiatrist, as they have significant interactions and are both very sedating and should be monitored closely. I am happy to schedule another appointment with you for follow up. Please reach out to our office on Monday. But we would need to discuss the gabapentin as well as the Xanax and the possible interactions.            Osman Metcalf MD

## 2020-06-07 RX ORDER — GABAPENTIN 100 MG/1
200 CAPSULE ORAL 3 TIMES DAILY
Qty: 180 CAPSULE | Refills: 0 | Status: SHIPPED | OUTPATIENT
Start: 2020-06-07 | End: 2022-08-11 | Stop reason: ALTCHOICE

## 2020-06-09 RX ORDER — CLONIDINE HYDROCHLORIDE 0.1 MG/1
TABLET ORAL
Qty: 28 TABLET | Refills: 0 | Status: SHIPPED | OUTPATIENT
Start: 2020-06-09 | End: 2020-10-14

## 2020-06-09 RX ORDER — QUETIAPINE FUMARATE 25 MG/1
25 TABLET, FILM COATED ORAL NIGHTLY PRN
Qty: 14 TABLET | Refills: 0 | Status: SHIPPED | OUTPATIENT
Start: 2020-06-09 | End: 2020-10-14

## 2020-06-24 RX ORDER — LAMOTRIGINE 150 MG/1
TABLET ORAL
Qty: 60 TABLET | Refills: 2 | OUTPATIENT
Start: 2020-06-24

## 2020-06-24 NOTE — TELEPHONE ENCOUNTER
REFILL REQUEST UPDATE FROM PHYSICIAN    Requested Prescriptions     Pending Prescriptions Disp Refills    lamoTRIgine (LAMICTAL) 150 MG tablet [Pharmacy Med Name: LAMOTRIGINE 150 MG TABLET] 60 tablet 2     Sig: TAKE 1 TABLET BY MOUTH TWICE A DAY       The following medications were refilled per patient request to the pharmacy identified as patient's preference pharmacy. Prior to refill, the [address] [place] [address] has been reviewed as needed for controlled substance monitoring and no concerns were identified. No orders of the defined types were placed in this encounter. Please inform the patient of any pending lab work, so they may complete this prior to the next medication fill.      Tiffanie Soto MD

## 2020-06-26 ENCOUNTER — OFFICE VISIT (OUTPATIENT)
Dept: FAMILY MEDICINE CLINIC | Age: 40
End: 2020-06-26
Payer: COMMERCIAL

## 2020-06-26 VITALS
WEIGHT: 172 LBS | HEART RATE: 68 BPM | TEMPERATURE: 97.6 F | DIASTOLIC BLOOD PRESSURE: 68 MMHG | BODY MASS INDEX: 26.15 KG/M2 | SYSTOLIC BLOOD PRESSURE: 122 MMHG | OXYGEN SATURATION: 96 %

## 2020-06-26 LAB
BILIRUBIN, POC: NORMAL
BLOOD URINE, POC: NORMAL
CLARITY, POC: CLEAR
COLOR, POC: YELLOW
GLUCOSE URINE, POC: NORMAL
HCG, URINE, POC: NEGATIVE
KETONES, POC: NORMAL
LEUKOCYTE EST, POC: NORMAL
Lab: NORMAL
NEGATIVE QC PASS/FAIL: NORMAL
NITRITE, POC: NORMAL
PH, POC: 7
POSITIVE QC PASS/FAIL: NORMAL
PROTEIN, POC: NORMAL
SPECIFIC GRAVITY, POC: 1.01
UROBILINOGEN, POC: NORMAL

## 2020-06-26 PROCEDURE — 81003 URINALYSIS AUTO W/O SCOPE: CPT | Performed by: NURSE PRACTITIONER

## 2020-06-26 PROCEDURE — 99213 OFFICE O/P EST LOW 20 MIN: CPT | Performed by: NURSE PRACTITIONER

## 2020-06-26 PROCEDURE — 81025 URINE PREGNANCY TEST: CPT | Performed by: NURSE PRACTITIONER

## 2020-06-26 RX ORDER — TRIAMCINOLONE ACETONIDE 1 MG/G
CREAM TOPICAL
Qty: 1 TUBE | Refills: 0 | Status: SHIPPED | OUTPATIENT
Start: 2020-06-26 | End: 2020-07-10

## 2020-06-26 RX ORDER — MECLIZINE HCL 12.5 MG/1
12.5 TABLET ORAL 3 TIMES DAILY PRN
Qty: 15 TABLET | Refills: 0 | Status: SHIPPED | OUTPATIENT
Start: 2020-06-26 | End: 2020-07-06

## 2020-06-26 ASSESSMENT — ENCOUNTER SYMPTOMS
EYE DISCHARGE: 0
EYE ITCHING: 0
EYE REDNESS: 0
NAUSEA: 0
ABDOMINAL PAIN: 0
SORE THROAT: 0
COUGH: 0
DIARRHEA: 0

## 2020-06-26 NOTE — PROGRESS NOTES
tonsillar, preauricular or posterior auricular adenopathy. Left side of head: No submental, submandibular, tonsillar, preauricular or posterior auricular adenopathy. Cervical: No cervical adenopathy. Skin:     General: Skin is warm. Coloration: Skin is not pale. Findings: Rash present. Rash is macular. Neurological:      Mental Status: She is alert and oriented to person, place, and time. Assessment & Plan    Diagnosis Orders   1. Dermatitis  triamcinolone (KENALOG) 0.1 % cream   2. Urine frequency  POCT Urinalysis No Micro (Auto)    Culture, Urine    POC Pregnancy Ur-Qual   3. Dizziness  meclizine (ANTIVERT) 12.5 MG tablet    neomycin-polymyxin-hydrocortisone (CORTISPORIN) 3.5-19889-9 otic solution   4. Acute otitis externa of right ear, unspecified type  neomycin-polymyxin-hydrocortisone (CORTISPORIN) 3.5-88330-1 otic solution     Orders Placed This Encounter   Procedures    Culture, Urine     Order Specific Question:   Specify (ex-cath, midstream, cysto, etc)? Answer:   midstream    POCT Urinalysis No Micro (Auto)    POC Pregnancy Ur-Qual     Orders Placed This Encounter   Medications    meclizine (ANTIVERT) 12.5 MG tablet     Sig: Take 1 tablet by mouth 3 times daily as needed for Dizziness     Dispense:  15 tablet     Refill:  0    triamcinolone (KENALOG) 0.1 % cream     Sig: Apply thin layer topically 2 times daily for max of 2 weeks. Dispense:  1 Tube     Refill:  0    neomycin-polymyxin-hydrocortisone (CORTISPORIN) 3.5-48686-7 otic solution     Sig: Place 3 drops into both ears 4 times daily     Dispense:  1 Bottle     Refill:  0     Return if symptoms worsen or fail to improve, for follow up with PCP. Reviewed with the patient: current clinical status & medications. Side effects of the medications prescribed today, as well as treatment plan/rationale and result expectations have been discussed with the patient who expresses understanding.       Close

## 2020-06-28 LAB
REASON FOR REJECTION: NORMAL
REJECTED TEST: NORMAL

## 2020-07-01 LAB — URINE CULTURE, ROUTINE: NORMAL

## 2020-07-01 RX ORDER — AMOXICILLIN 500 MG/1
500 CAPSULE ORAL 3 TIMES DAILY
Qty: 21 CAPSULE | Refills: 0 | Status: SHIPPED | OUTPATIENT
Start: 2020-07-01 | End: 2020-07-08

## 2020-07-14 RX ORDER — DEXTROAMPHETAMINE SACCHARATE, AMPHETAMINE ASPARTATE, DEXTROAMPHETAMINE SULFATE AND AMPHETAMINE SULFATE 7.5; 7.5; 7.5; 7.5 MG/1; MG/1; MG/1; MG/1
30 TABLET ORAL 2 TIMES DAILY
Qty: 60 TABLET | Refills: 0 | Status: SHIPPED | OUTPATIENT
Start: 2020-07-14 | End: 2022-08-31

## 2020-07-23 NOTE — TELEPHONE ENCOUNTER
Requesting medication refill. Please approve or deny this request.    Rx requested:  Requested Prescriptions     Pending Prescriptions Disp Refills    metFORMIN (GLUCOPHAGE) 500 MG tablet [Pharmacy Med Name: Saida Goncalves  MG TABLET] 60 tablet 0     Sig: TAKE 1 TABLET BY MOUTH EVERY DAY       Last Office Visit:   3/25/2020    Last Filled:      Last Labs:      Next Visit Date:  No future appointments.

## 2020-09-25 ENCOUNTER — TELEPHONE (OUTPATIENT)
Dept: FAMILY MEDICINE CLINIC | Age: 40
End: 2020-09-25

## 2020-09-28 NOTE — TELEPHONE ENCOUNTER
Requesting medication refill. Please approve or deny this request.    Rx requested:  Requested Prescriptions     Pending Prescriptions Disp Refills    metFORMIN (GLUCOPHAGE) 500 MG tablet [Pharmacy Med Name: Araceli Serge  MG TABLET] 60 tablet 0     Sig: TAKE 1 TABLET BY MOUTH EVERY DAY       Last Office Visit:   3/25/2020    Last Filled:      Last Labs:      Next Visit Date:  No future appointments.

## 2020-10-14 RX ORDER — CLONIDINE HYDROCHLORIDE 0.1 MG/1
TABLET ORAL
Qty: 28 TABLET | Refills: 0 | Status: SHIPPED | OUTPATIENT
Start: 2020-10-14 | End: 2022-08-11 | Stop reason: ALTCHOICE

## 2020-10-14 RX ORDER — QUETIAPINE FUMARATE 25 MG/1
25 TABLET, FILM COATED ORAL NIGHTLY PRN
Qty: 14 TABLET | Refills: 0 | Status: SHIPPED | OUTPATIENT
Start: 2020-10-14

## 2020-11-04 ENCOUNTER — TELEPHONE (OUTPATIENT)
Dept: FAMILY MEDICINE CLINIC | Age: 40
End: 2020-11-04

## 2020-12-22 RX ORDER — VALACYCLOVIR HYDROCHLORIDE 1 G/1
TABLET, FILM COATED ORAL
Qty: 21 TABLET | Refills: 0 | Status: SHIPPED | OUTPATIENT
Start: 2020-12-22 | End: 2021-12-15 | Stop reason: SDUPTHER

## 2021-02-10 ENCOUNTER — TELEPHONE (OUTPATIENT)
Dept: FAMILY MEDICINE CLINIC | Age: 41
End: 2021-02-10

## 2021-03-15 ENCOUNTER — TELEPHONE (OUTPATIENT)
Dept: FAMILY MEDICINE CLINIC | Age: 41
End: 2021-03-15

## 2021-04-05 ENCOUNTER — TELEPHONE (OUTPATIENT)
Dept: FAMILY MEDICINE CLINIC | Age: 41
End: 2021-04-05

## 2021-04-12 PROBLEM — E78.49 OTHER HYPERLIPIDEMIA: Status: ACTIVE | Noted: 2021-04-12

## 2021-04-12 PROBLEM — E11.9 TYPE 2 DIABETES MELLITUS WITHOUT COMPLICATION, WITHOUT LONG-TERM CURRENT USE OF INSULIN (HCC): Status: ACTIVE | Noted: 2021-04-12

## 2021-04-15 ENCOUNTER — TELEPHONE (OUTPATIENT)
Dept: FAMILY MEDICINE CLINIC | Age: 41
End: 2021-04-15

## 2021-04-22 ENCOUNTER — TELEPHONE (OUTPATIENT)
Dept: FAMILY MEDICINE CLINIC | Age: 41
End: 2021-04-22

## 2021-04-22 NOTE — TELEPHONE ENCOUNTER
Unable to LM - VM full    Called to request an A1C check and if PT calls back please transfer to Dr Stefanie Lal' office

## 2021-05-18 DIAGNOSIS — E11.9 TYPE 2 DIABETES MELLITUS WITHOUT COMPLICATION, WITHOUT LONG-TERM CURRENT USE OF INSULIN (HCC): Primary | ICD-10-CM

## 2021-05-18 LAB
ALBUMIN SERPL-MCNC: 4.9 G/DL (ref 3.5–4.6)
ALP BLD-CCNC: 81 U/L (ref 40–130)
ALT SERPL-CCNC: 18 U/L (ref 0–33)
ANION GAP SERPL CALCULATED.3IONS-SCNC: 14 MEQ/L (ref 9–15)
AST SERPL-CCNC: 17 U/L (ref 0–35)
BASOPHILS ABSOLUTE: 0.1 K/UL (ref 0–0.2)
BASOPHILS RELATIVE PERCENT: 1.1 %
BILIRUB SERPL-MCNC: 0.4 MG/DL (ref 0.2–0.7)
BUN BLDV-MCNC: 8 MG/DL (ref 6–20)
CALCIUM SERPL-MCNC: 9.7 MG/DL (ref 8.5–9.9)
CHLORIDE BLD-SCNC: 99 MEQ/L (ref 95–107)
CHOLESTEROL, TOTAL: 264 MG/DL (ref 0–199)
CO2: 25 MEQ/L (ref 20–31)
CREAT SERPL-MCNC: 0.79 MG/DL (ref 0.5–0.9)
CREATININE URINE: 245.7 MG/DL
EOSINOPHILS ABSOLUTE: 0 K/UL (ref 0–0.7)
EOSINOPHILS RELATIVE PERCENT: 0.8 %
GFR AFRICAN AMERICAN: >60
GFR NON-AFRICAN AMERICAN: >60
GLOBULIN: 2.1 G/DL (ref 2.3–3.5)
GLUCOSE BLD-MCNC: 171 MG/DL (ref 70–99)
HBA1C MFR BLD: 6.7 % (ref 4.8–5.9)
HCT VFR BLD CALC: 41.3 % (ref 37–47)
HDLC SERPL-MCNC: 62 MG/DL (ref 40–59)
HEMOGLOBIN: 14.1 G/DL (ref 12–16)
LDL CHOLESTEROL CALCULATED: 165 MG/DL (ref 0–129)
LYMPHOCYTES ABSOLUTE: 2 K/UL (ref 1–4.8)
LYMPHOCYTES RELATIVE PERCENT: 33.3 %
MCH RBC QN AUTO: 29.1 PG (ref 27–31.3)
MCHC RBC AUTO-ENTMCNC: 34.1 % (ref 33–37)
MCV RBC AUTO: 85.3 FL (ref 82–100)
MICROALBUMIN UR-MCNC: <1.2 MG/DL
MICROALBUMIN/CREAT UR-RTO: NORMAL MG/G (ref 0–30)
MONOCYTES ABSOLUTE: 0.6 K/UL (ref 0.2–0.8)
MONOCYTES RELATIVE PERCENT: 9.2 %
NEUTROPHILS ABSOLUTE: 3.4 K/UL (ref 1.4–6.5)
NEUTROPHILS RELATIVE PERCENT: 55.6 %
PDW BLD-RTO: 12.8 % (ref 11.5–14.5)
PLATELET # BLD: 248 K/UL (ref 130–400)
POTASSIUM SERPL-SCNC: 4.3 MEQ/L (ref 3.4–4.9)
RBC # BLD: 4.84 M/UL (ref 4.2–5.4)
SODIUM BLD-SCNC: 138 MEQ/L (ref 135–144)
TOTAL PROTEIN: 7 G/DL (ref 6.3–8)
TRIGL SERPL-MCNC: 183 MG/DL (ref 0–150)
VITAMIN B-12: 539 PG/ML (ref 232–1245)
WBC # BLD: 6.1 K/UL (ref 4.8–10.8)

## 2021-06-09 ENCOUNTER — VIRTUAL VISIT (OUTPATIENT)
Dept: FAMILY MEDICINE CLINIC | Age: 41
End: 2021-06-09
Payer: COMMERCIAL

## 2021-06-09 ENCOUNTER — TELEPHONE (OUTPATIENT)
Dept: FAMILY MEDICINE CLINIC | Age: 41
End: 2021-06-09

## 2021-06-09 DIAGNOSIS — E78.5 HYPERLIPIDEMIA, UNSPECIFIED HYPERLIPIDEMIA TYPE: ICD-10-CM

## 2021-06-09 DIAGNOSIS — E11.9 TYPE 2 DIABETES MELLITUS WITHOUT COMPLICATION, WITHOUT LONG-TERM CURRENT USE OF INSULIN (HCC): Primary | ICD-10-CM

## 2021-06-09 DIAGNOSIS — F90.9 ATTENTION DEFICIT HYPERACTIVITY DISORDER (ADHD), UNSPECIFIED ADHD TYPE: ICD-10-CM

## 2021-06-09 PROCEDURE — 99214 OFFICE O/P EST MOD 30 MIN: CPT | Performed by: INTERNAL MEDICINE

## 2021-06-09 RX ORDER — PRAVASTATIN SODIUM 20 MG
20 TABLET ORAL DAILY
Qty: 90 TABLET | Refills: 1 | Status: SHIPPED | OUTPATIENT
Start: 2021-06-09 | End: 2022-08-11 | Stop reason: ALTCHOICE

## 2021-06-09 SDOH — ECONOMIC STABILITY: FOOD INSECURITY: WITHIN THE PAST 12 MONTHS, YOU WORRIED THAT YOUR FOOD WOULD RUN OUT BEFORE YOU GOT MONEY TO BUY MORE.: NEVER TRUE

## 2021-06-09 SDOH — ECONOMIC STABILITY: FOOD INSECURITY: WITHIN THE PAST 12 MONTHS, THE FOOD YOU BOUGHT JUST DIDN'T LAST AND YOU DIDN'T HAVE MONEY TO GET MORE.: NEVER TRUE

## 2021-06-09 ASSESSMENT — ENCOUNTER SYMPTOMS
PHOTOPHOBIA: 0
TROUBLE SWALLOWING: 0
FACIAL SWELLING: 0
EYE REDNESS: 0
EYE DISCHARGE: 0
SORE THROAT: 0
ABDOMINAL PAIN: 0
COLOR CHANGE: 0
CONSTIPATION: 0
SINUS PRESSURE: 0
COUGH: 0
APNEA: 0
WHEEZING: 0
ABDOMINAL DISTENTION: 0
SHORTNESS OF BREATH: 0
EYE PAIN: 0
NAUSEA: 0
RHINORRHEA: 0
VOMITING: 0
EYE ITCHING: 0
SINUS PAIN: 0
BLOOD IN STOOL: 0
DIARRHEA: 0
BACK PAIN: 0
RECTAL PAIN: 0
VOICE CHANGE: 0
CHEST TIGHTNESS: 0

## 2021-06-09 ASSESSMENT — PATIENT HEALTH QUESTIONNAIRE - PHQ9
SUM OF ALL RESPONSES TO PHQ QUESTIONS 1-9: 1
2. FEELING DOWN, DEPRESSED OR HOPELESS: 0
SUM OF ALL RESPONSES TO PHQ QUESTIONS 1-9: 1
SUM OF ALL RESPONSES TO PHQ9 QUESTIONS 1 & 2: 1
SUM OF ALL RESPONSES TO PHQ QUESTIONS 1-9: 1
1. LITTLE INTEREST OR PLEASURE IN DOING THINGS: 1

## 2021-06-09 ASSESSMENT — SOCIAL DETERMINANTS OF HEALTH (SDOH): HOW HARD IS IT FOR YOU TO PAY FOR THE VERY BASICS LIKE FOOD, HOUSING, MEDICAL CARE, AND HEATING?: NOT HARD AT ALL

## 2021-06-09 NOTE — PROGRESS NOTES
for chest pain, palpitations and leg swelling. Gastrointestinal: Negative for abdominal distention, abdominal pain, blood in stool, constipation, diarrhea, nausea, rectal pain and vomiting. Endocrine: Negative for cold intolerance, heat intolerance, polydipsia, polyphagia and polyuria. Genitourinary: Negative for decreased urine volume, difficulty urinating, dysuria, flank pain, frequency, genital sores, hematuria and urgency. Musculoskeletal: Negative for arthralgias, back pain, gait problem, joint swelling, myalgias, neck pain and neck stiffness. Skin: Negative for color change, rash and wound. Allergic/Immunologic: Negative for environmental allergies and food allergies. Neurological: Negative for dizziness, tremors, seizures, syncope, facial asymmetry, speech difficulty, weakness, light-headedness, numbness and headaches. Hematological: Negative for adenopathy. Does not bruise/bleed easily. Psychiatric/Behavioral: Negative for agitation, confusion, decreased concentration, hallucinations, self-injury, sleep disturbance and suicidal ideas. The patient is not nervous/anxious. Objective: There were no vitals taken for this visit. Physical Exam  Constitutional:       General: She is not in acute distress. Appearance: She is well-developed. HENT:      Head: Normocephalic. Right Ear: External ear normal.      Left Ear: External ear normal.   Eyes:      Conjunctiva/sclera: Conjunctivae normal.   Neck:      Vascular: No JVD. Trachea: No tracheal deviation. Cardiovascular:      Rate and Rhythm: Normal rate and regular rhythm. Heart sounds: Normal heart sounds. Pulmonary:      Effort: Pulmonary effort is normal. No respiratory distress. Breath sounds: Normal breath sounds. No wheezing or rales. Chest:      Chest wall: No tenderness. Abdominal:      General: Bowel sounds are normal. There is no distension. Palpations: Abdomen is soft. There is no mass.

## 2021-12-14 NOTE — TELEPHONE ENCOUNTER
Requesting medication refill.  Please approve or deny this request.    Rx requested:  Requested Prescriptions     Pending Prescriptions Disp Refills    metFORMIN (GLUCOPHAGE) 500 MG tablet 60 tablet 0     Sig: TAKE 1 TABLET BY MOUTH EVERY DAY       Last Office Visit:   6/9/2021    Last Filled:      Last Labs:      Next Visit Date:  Future Appointments   Date Time Provider [place]   12/15/2021  1:45 PM Jane Pathak  [place], [place] [address]

## 2021-12-15 ENCOUNTER — OFFICE VISIT (OUTPATIENT)
Dept: FAMILY MEDICINE CLINIC | Age: 41
End: 2021-12-15
Payer: COMMERCIAL

## 2021-12-15 VITALS
OXYGEN SATURATION: 99 % | WEIGHT: 179 LBS | SYSTOLIC BLOOD PRESSURE: 120 MMHG | DIASTOLIC BLOOD PRESSURE: 80 MMHG | HEART RATE: 79 BPM | RESPIRATION RATE: 16 BRPM | HEIGHT: 68 IN | BODY MASS INDEX: 27.13 KG/M2

## 2021-12-15 DIAGNOSIS — K21.9 GASTROESOPHAGEAL REFLUX DISEASE WITHOUT ESOPHAGITIS: ICD-10-CM

## 2021-12-15 DIAGNOSIS — F31.9 BIPOLAR AFFECTIVE DISORDER, REMISSION STATUS UNSPECIFIED (HCC): ICD-10-CM

## 2021-12-15 DIAGNOSIS — E78.5 HYPERLIPIDEMIA, UNSPECIFIED HYPERLIPIDEMIA TYPE: ICD-10-CM

## 2021-12-15 DIAGNOSIS — E11.9 TYPE 2 DIABETES MELLITUS WITHOUT COMPLICATION, WITHOUT LONG-TERM CURRENT USE OF INSULIN (HCC): Primary | ICD-10-CM

## 2021-12-15 DIAGNOSIS — F90.9 ATTENTION DEFICIT HYPERACTIVITY DISORDER (ADHD), UNSPECIFIED ADHD TYPE: ICD-10-CM

## 2021-12-15 LAB — HBA1C MFR BLD: 8.5 %

## 2021-12-15 PROCEDURE — 3052F HG A1C>EQUAL 8.0%<EQUAL 9.0%: CPT | Performed by: INTERNAL MEDICINE

## 2021-12-15 PROCEDURE — 99214 OFFICE O/P EST MOD 30 MIN: CPT | Performed by: INTERNAL MEDICINE

## 2021-12-15 PROCEDURE — 83036 HEMOGLOBIN GLYCOSYLATED A1C: CPT | Performed by: INTERNAL MEDICINE

## 2021-12-15 RX ORDER — METFORMIN HYDROCHLORIDE 500 MG/1
1000 TABLET, EXTENDED RELEASE ORAL
Qty: 60 TABLET | Refills: 0 | Status: SHIPPED | OUTPATIENT
Start: 2021-12-15 | End: 2021-12-30 | Stop reason: SDUPTHER

## 2021-12-15 RX ORDER — VALACYCLOVIR HYDROCHLORIDE 1 G/1
TABLET, FILM COATED ORAL
Qty: 21 TABLET | Refills: 0 | Status: SHIPPED | OUTPATIENT
Start: 2021-12-15 | End: 2021-12-21

## 2021-12-15 RX ORDER — GLUCOSAMINE HCL/CHONDROITIN SU 500-400 MG
CAPSULE ORAL
Qty: 100 STRIP | Refills: 3 | Status: SHIPPED | OUTPATIENT
Start: 2021-12-15

## 2021-12-15 ASSESSMENT — ENCOUNTER SYMPTOMS
SORE THROAT: 0
ABDOMINAL DISTENTION: 0
WHEEZING: 0
BLOOD IN STOOL: 0
BACK PAIN: 0
EYE ITCHING: 0
TROUBLE SWALLOWING: 0
VOICE CHANGE: 0
SHORTNESS OF BREATH: 0
VOMITING: 0
ABDOMINAL PAIN: 0
EYE REDNESS: 0
EYE DISCHARGE: 0
FACIAL SWELLING: 0
DIARRHEA: 0
CHEST TIGHTNESS: 0
SINUS PRESSURE: 0
NAUSEA: 0
SINUS PAIN: 0
RHINORRHEA: 0
RECTAL PAIN: 0
COUGH: 0
COLOR CHANGE: 0
PHOTOPHOBIA: 0
APNEA: 0
EYE PAIN: 0
CONSTIPATION: 0

## 2021-12-15 NOTE — PROGRESS NOTES
Negative for chest pain, palpitations and leg swelling. Gastrointestinal: Negative for abdominal distention, abdominal pain, blood in stool, constipation, diarrhea, nausea, rectal pain and vomiting. Endocrine: Negative for cold intolerance, heat intolerance, polydipsia, polyphagia and polyuria. Genitourinary: Negative for decreased urine volume, difficulty urinating, dysuria, flank pain, frequency, genital sores, hematuria and urgency. Musculoskeletal: Negative for arthralgias, back pain, gait problem, joint swelling, myalgias, neck pain and neck stiffness. Skin: Negative for color change, rash and wound. Allergic/Immunologic: Negative for environmental allergies and food allergies. Neurological: Negative for dizziness, tremors, seizures, syncope, facial asymmetry, speech difficulty, weakness, light-headedness, numbness and headaches. Hematological: Negative for adenopathy. Does not bruise/bleed easily. Psychiatric/Behavioral: Negative for agitation, confusion, decreased concentration, hallucinations, self-injury, sleep disturbance and suicidal ideas. The patient is not nervous/anxious. Objective:   /80   Pulse 79   Resp 16   Ht 5' 8\" (1.727 m)   Wt 179 lb (81.2 kg)   SpO2 99%   BMI 27.22 kg/m²     Physical Exam  Constitutional:       General: She is not in acute distress. Appearance: She is well-developed. HENT:      Head: Normocephalic. Right Ear: External ear normal.      Left Ear: External ear normal.   Eyes:      Conjunctiva/sclera: Conjunctivae normal.   Neck:      Vascular: No JVD. Trachea: No tracheal deviation. Cardiovascular:      Rate and Rhythm: Normal rate and regular rhythm. Heart sounds: Normal heart sounds. Pulmonary:      Effort: Pulmonary effort is normal. No respiratory distress. Breath sounds: Normal breath sounds. No wheezing or rales. Chest:      Chest wall: No tenderness.    Abdominal:      General: Bowel sounds are normal. There is no distension. Palpations: Abdomen is soft. There is no mass. Tenderness: There is no abdominal tenderness. There is no guarding or rebound. Musculoskeletal:         General: No tenderness or deformity. Cervical back: Neck supple. Skin:     General: Skin is warm and dry. Coloration: Skin is not pale. Findings: No erythema or rash. Neurological:      Mental Status: She is alert and oriented to person, place, and time. Cranial Nerves: No cranial nerve deficit. Motor: No abnormal muscle tone. Psychiatric:         Thought Content: Thought content normal.         Judgment: Judgment normal.       The 10-year ASCVD risk score (Elizabeth Winchester, et al., 2013) is: 1.6%    Values used to calculate the score:      Age: 35 years      Sex: Female      Is Non- : No      Diabetic: Yes      Tobacco smoker: No      Systolic Blood Pressure: 815 mmHg      Is BP treated: No      HDL Cholesterol: 62 mg/dL      Total Cholesterol: 264 mg/dL    Assessment:       Diagnosis Orders   1. Type 2 diabetes mellitus without complication, without long-term current use of insulin (HCC)  POCT glycosylated hemoglobin (Hb A1C)    blood glucose monitor strips    blood glucose monitor supplies    blood glucose monitor kit and supplies   2. Attention deficit hyperactivity disorder (ADHD), unspecified ADHD type     3. Hyperlipidemia, unspecified hyperlipidemia type     4. Bipolar affective disorder, remission status unspecified ([facility]           Plan:      Rakan Booth was seen today for adhd and diabetes. Diagnoses and all orders for this visit:    Type 2 diabetes mellitus without complication, without long-term current use of insulin (HCC) with associated neuropathy  -     POCT glycosylated hemoglobin (Hb A1C)  -     blood glucose monitor strips; Test 2 times a day & as needed for symptoms of irregular blood glucose.  Dispense sufficient amount for indicated testing frequency plus additional

## 2021-12-21 RX ORDER — VALACYCLOVIR HYDROCHLORIDE 1 G/1
TABLET, FILM COATED ORAL
Qty: 21 TABLET | Refills: 0 | Status: SHIPPED | OUTPATIENT
Start: 2021-12-21

## 2021-12-21 NOTE — TELEPHONE ENCOUNTER
Requesting medication refill.  Please approve or deny this request.    Rx requested:  Requested Prescriptions     Pending Prescriptions Disp Refills    valACYclovir (VALTREX) 1 g tablet [Pharmacy Med Name: VALACYCLOVIR HCL 1 GRAM TABLET] 21 tablet 0     Sig: TAKE 1 TABLET BY MOUTH THREE TIMES A DAY FOR 7 DAYS       Last Office Visit:   12/15/2021    Last Filled:      Last Labs:      Next Visit Date:  Future Appointments   Date Time Provider [facility]   2/15/2022  4:00 PM José Miguel Landa MD [address] [facility]

## 2021-12-30 RX ORDER — METFORMIN HYDROCHLORIDE 500 MG/1
1000 TABLET, EXTENDED RELEASE ORAL DAILY
Qty: 60 TABLET | Refills: 3 | Status: SHIPPED | OUTPATIENT
Start: 2021-12-30 | End: 2022-01-03 | Stop reason: SDUPTHER

## 2022-01-03 RX ORDER — METFORMIN HYDROCHLORIDE 500 MG/1
1000 TABLET, EXTENDED RELEASE ORAL DAILY
Qty: 180 TABLET | Refills: 3 | Status: SHIPPED | OUTPATIENT
Start: 2022-01-03 | End: 2022-02-01 | Stop reason: SDUPTHER

## 2022-02-01 RX ORDER — METFORMIN HYDROCHLORIDE 500 MG/1
1000 TABLET, EXTENDED RELEASE ORAL DAILY
Qty: 180 TABLET | Refills: 3 | Status: SHIPPED | OUTPATIENT
Start: 2022-02-01 | End: 2022-08-11 | Stop reason: ALTCHOICE

## 2022-02-01 NOTE — TELEPHONE ENCOUNTER
PT called to refill the metformin 500 MG 2 times a day. PT last seen 12/15/21   Up coming appt.  2/15/22

## 2022-02-15 ENCOUNTER — TELEMEDICINE (OUTPATIENT)
Dept: FAMILY MEDICINE CLINIC | Age: 42
End: 2022-02-15
Payer: COMMERCIAL

## 2022-02-15 DIAGNOSIS — F90.9 ATTENTION DEFICIT HYPERACTIVITY DISORDER (ADHD), UNSPECIFIED ADHD TYPE: ICD-10-CM

## 2022-02-15 DIAGNOSIS — E11.9 TYPE 2 DIABETES MELLITUS WITHOUT COMPLICATION, WITHOUT LONG-TERM CURRENT USE OF INSULIN (HCC): Primary | ICD-10-CM

## 2022-02-15 DIAGNOSIS — F31.9 BIPOLAR AFFECTIVE DISORDER, REMISSION STATUS UNSPECIFIED (HCC): ICD-10-CM

## 2022-02-15 DIAGNOSIS — K21.9 GASTROESOPHAGEAL REFLUX DISEASE WITHOUT ESOPHAGITIS: ICD-10-CM

## 2022-02-15 DIAGNOSIS — F41.9 ANXIETY: ICD-10-CM

## 2022-02-15 PROCEDURE — 99214 OFFICE O/P EST MOD 30 MIN: CPT | Performed by: INTERNAL MEDICINE

## 2022-02-15 PROCEDURE — 83036 HEMOGLOBIN GLYCOSYLATED A1C: CPT | Performed by: INTERNAL MEDICINE

## 2022-02-15 ASSESSMENT — ENCOUNTER SYMPTOMS
EYE ITCHING: 0
CHEST TIGHTNESS: 0
PHOTOPHOBIA: 0
BACK PAIN: 0
TROUBLE SWALLOWING: 0
RECTAL PAIN: 0
WHEEZING: 0
CONSTIPATION: 0
COLOR CHANGE: 0
COUGH: 0
SORE THROAT: 0
FACIAL SWELLING: 0
NAUSEA: 0
APNEA: 0
BLOOD IN STOOL: 0
SINUS PAIN: 0
VOICE CHANGE: 0
VOMITING: 0
ABDOMINAL PAIN: 0
EYE REDNESS: 0
EYE DISCHARGE: 0
SHORTNESS OF BREATH: 0
RHINORRHEA: 0
ABDOMINAL DISTENTION: 0
SINUS PRESSURE: 0
DIARRHEA: 0
EYE PAIN: 0

## 2022-02-15 NOTE — PROGRESS NOTES
Subjective:      Patient ID: Nathalie Bellamy is a 35 y.o. female Established patient, here for evaluation of the following chief complaint(s):  No chief complaint on file. HPI  The patient is a 56-year-old female with a history of bipolar disorder, attention deficit disorder, GERD and anxiety presents for follow-up visit.        Type 2 diabetes: The patient reports intermittent blurred vision. Her a1c today is 8.5. She acknowledges intermittent dietary indiscretion.             Attention deficit disorder: The patient is compliant with Adderall 30 mg twice daily and this is controlling her attention deficit disorder symptoms.            Bipolar disorder: The patient's bipolar disorder remains under good control with the patient is compliant with Lamictal          GERD: The patient receives Prilosec 40 mg orally daily. Her symptoms are well controlled.            Anxiety: The patient is compliant with Wellbutrin and Xanax twice daily. Her symptoms are well controlled at this time.              Hyperlipidemia: Pt has hyperlipidemia. Her most recent profile is noted below:    Component      Latest Ref Rng & Units 5/18/2021           3:23 PM   Cholesterol, Total      0 - 199 mg/dL 264 (H)   Triglycerides      0 - 150 mg/dL 183 (H)   HDL Cholesterol      40 - 59 mg/dL 62 (H)   LDL Calculated      0 - 129 mg/dL 165 (H)           Review of Systems   Constitutional: Negative for chills, diaphoresis, fatigue and fever. HENT: Negative for congestion, dental problem, drooling, ear discharge, ear pain, facial swelling, hearing loss, mouth sores, nosebleeds, postnasal drip, rhinorrhea, sinus pressure, sinus pain, sneezing, sore throat, tinnitus, trouble swallowing and voice change. Eyes: Negative for photophobia, pain, discharge, redness, itching and visual disturbance. Respiratory: Negative for apnea, cough, chest tightness, shortness of breath and wheezing.     Cardiovascular: Negative for chest pain, palpitations and leg swelling. Gastrointestinal: Negative for abdominal distention, abdominal pain, blood in stool, constipation, diarrhea, nausea, rectal pain and vomiting. Endocrine: Negative for cold intolerance, heat intolerance, polydipsia, polyphagia and polyuria. Genitourinary: Negative for decreased urine volume, difficulty urinating, dysuria, flank pain, frequency, genital sores, hematuria and urgency. Musculoskeletal: Negative for arthralgias, back pain, gait problem, joint swelling, myalgias, neck pain and neck stiffness. Skin: Negative for color change, rash and wound. Allergic/Immunologic: Negative for environmental allergies and food allergies. Neurological: Negative for dizziness, tremors, seizures, syncope, facial asymmetry, speech difficulty, weakness, light-headedness, numbness and headaches. Hematological: Negative for adenopathy. Does not bruise/bleed easily. Psychiatric/Behavioral: Negative for agitation, confusion, decreased concentration, hallucinations, self-injury, sleep disturbance and suicidal ideas. The patient is not nervous/anxious. Objective: There were no vitals taken for this visit. Physical Exam  Constitutional:       General: She is not in acute distress. Appearance: She is well-developed. HENT:      Head: Normocephalic. Right Ear: External ear normal.      Left Ear: External ear normal.   Eyes:      Conjunctiva/sclera: Conjunctivae normal.   Neck:      Vascular: No JVD. Trachea: No tracheal deviation. Cardiovascular:      Rate and Rhythm: Normal rate and regular rhythm. Heart sounds: Normal heart sounds. Pulmonary:      Effort: Pulmonary effort is normal. No respiratory distress. Breath sounds: Normal breath sounds. No wheezing or rales. Chest:      Chest wall: No tenderness. Abdominal:      General: Bowel sounds are normal. There is no distension. Palpations: Abdomen is soft. There is no mass. Tenderness:  There is no abdominal tenderness. There is no guarding or rebound. Musculoskeletal:         General: No tenderness or deformity. Cervical back: Neck supple. Skin:     General: Skin is warm and dry. Coloration: Skin is not pale. Findings: No erythema or rash. Neurological:      Mental Status: She is alert and oriented to person, place, and time. Cranial Nerves: No cranial nerve deficit. Motor: No abnormal muscle tone. Psychiatric:         Thought Content: Thought content normal.         Judgment: Judgment normal.       The 10-year ASCVD risk score (Catherine Elizabeth et al., 2013) is: 1.6%    Values used to calculate the score:      Age: 35 years      Sex: Female      Is Non- : No      Diabetic: Yes      Tobacco smoker: No      Systolic Blood Pressure: 823 mmHg      Is BP treated: No      HDL Cholesterol: 62 mg/dL      Total Cholesterol: 264 mg/dL    Assessment:       Diagnosis Orders   1. Type 2 diabetes mellitus without complication, without long-term current use of insulin ([facility] [address]           Plan:      Randi Smith was seen today for adhd and diabetes. Diagnoses and all orders for this visit:    Type 2 diabetes mellitus without complication, without long-term current use of insulin ([facility]) with associated neuropathy  -     POCT glycosylated hemoglobin (Hb A1C)  -     blood glucose monitor strips; Test 2 times a day & as needed for symptoms of irregular blood glucose. Dispense sufficient amount for indicated testing frequency plus additional to accommodate PRN testing needs. -     blood glucose monitor supplies; lancets  Test 2 times a day & as needed for symptoms of irregular blood glucose. Dispense sufficient amount for indicated testing frequency plus additional to accommodate PRN testing needs. -     blood glucose monitor kit and supplies;  Test 2 times a day  -increase metformin to 1000mg  -Continue Neurontin      Attention deficit hyperactivity disorder (ADHD), unspecified ADHD type  -Continue Adderall. Hyperlipidemia, unspecified hyperlipidemia type-continue Pravachol    Bipolar affective disorder, remission status unspecified (HCC)-continue Seroquel and Lamictal    Other orders  -     valACYclovir (VALTREX) 1 g tablet; TAKE 1 TABLET BY MOUTH THREE TIMES A DAY FOR 7 DAYS  -     metFORMIN (GLUCOPHAGE-XR) 500 MG extended release tablet; Take 2 tablets by mouth daily (with breakfast)    GERD-continue Prilosec    No follow-ups on file. On this date 02/15/22 I have spent 30 minutes reviewing previous notes, test results and face to face with the patient discussing the diagnosis and importance of compliance with the treatment plan. Ruma Garcia MD    Please note, this report has been partially produced using speech recognition software  and may cause  and /or contain errors related to that system including grammar, punctuation and spelling as well as words and phrases that may seem inappropriate. If there are questions or concerns please feel free to contact me to clarify.

## 2022-08-10 NOTE — TELEPHONE ENCOUNTER
requesting medication refill.      Rx requested:  Requested Prescriptions     Pending Prescriptions Disp Refills    metFORMIN (GLUCOPHAGE) 500 MG tablet 90 tablet 0     Sig: Take 1 tablet by mouth daily       Last Office Visit:   2/15/2022    Last Tox screen:        Last Medication contract:        Next Visit Date:  Future Appointments   Date Time Provider Sharon Longoria   8/11/2022  9:15 AM Beverly Hammond DO [address] [place] [address] 7

## 2022-08-11 ENCOUNTER — OFFICE VISIT (OUTPATIENT)
Dept: FAMILY MEDICINE CLINIC | Age: 42
End: 2022-08-11
Payer: COMMERCIAL

## 2022-08-11 VITALS
WEIGHT: 157.2 LBS | SYSTOLIC BLOOD PRESSURE: 130 MMHG | OXYGEN SATURATION: 98 % | DIASTOLIC BLOOD PRESSURE: 87 MMHG | BODY MASS INDEX: 23.82 KG/M2 | HEART RATE: 113 BPM | HEIGHT: 68 IN | TEMPERATURE: 97.3 F

## 2022-08-11 DIAGNOSIS — E78.5 HYPERLIPIDEMIA, UNSPECIFIED HYPERLIPIDEMIA TYPE: ICD-10-CM

## 2022-08-11 DIAGNOSIS — Z82.49 FAMILY HISTORY OF CARDIOVASCULAR DISORDER: ICD-10-CM

## 2022-08-11 DIAGNOSIS — E11.9 TYPE 2 DIABETES MELLITUS WITHOUT COMPLICATION, WITHOUT LONG-TERM CURRENT USE OF INSULIN (HCC): Primary | ICD-10-CM

## 2022-08-11 DIAGNOSIS — R07.89 CHEST DISCOMFORT: ICD-10-CM

## 2022-08-11 DIAGNOSIS — Z12.31 SCREENING MAMMOGRAM, ENCOUNTER FOR: ICD-10-CM

## 2022-08-11 LAB — HBA1C MFR BLD: 11.6 %

## 2022-08-11 PROCEDURE — 99214 OFFICE O/P EST MOD 30 MIN: CPT | Performed by: STUDENT IN AN ORGANIZED HEALTH CARE EDUCATION/TRAINING PROGRAM

## 2022-08-11 PROCEDURE — 83036 HEMOGLOBIN GLYCOSYLATED A1C: CPT | Performed by: STUDENT IN AN ORGANIZED HEALTH CARE EDUCATION/TRAINING PROGRAM

## 2022-08-11 PROCEDURE — 3046F HEMOGLOBIN A1C LEVEL >9.0%: CPT | Performed by: STUDENT IN AN ORGANIZED HEALTH CARE EDUCATION/TRAINING PROGRAM

## 2022-08-11 RX ORDER — METFORMIN HYDROCHLORIDE 500 MG/1
1000 TABLET, EXTENDED RELEASE ORAL 2 TIMES DAILY
Qty: 360 TABLET | Refills: 1 | Status: SHIPPED | OUTPATIENT
Start: 2022-08-11 | End: 2022-11-09

## 2022-08-11 RX ORDER — ATORVASTATIN CALCIUM 40 MG/1
40 TABLET, FILM COATED ORAL DAILY
Qty: 90 TABLET | Refills: 1 | Status: SHIPPED | OUTPATIENT
Start: 2022-08-11 | End: 2022-08-31 | Stop reason: DRUGHIGH

## 2022-08-11 RX ORDER — DIAZEPAM 5 MG/1
5 TABLET ORAL EVERY 6 HOURS PRN
COMMUNITY

## 2022-08-11 RX ORDER — ALPRAZOLAM 0.5 MG/1
0.5 TABLET ORAL 4 TIMES DAILY
COMMUNITY
End: 2022-08-31 | Stop reason: ALTCHOICE

## 2022-08-11 SDOH — ECONOMIC STABILITY: FOOD INSECURITY: WITHIN THE PAST 12 MONTHS, YOU WORRIED THAT YOUR FOOD WOULD RUN OUT BEFORE YOU GOT MONEY TO BUY MORE.: OFTEN TRUE

## 2022-08-11 SDOH — ECONOMIC STABILITY: TRANSPORTATION INSECURITY
IN THE PAST 12 MONTHS, HAS LACK OF TRANSPORTATION KEPT YOU FROM MEETINGS, WORK, OR FROM GETTING THINGS NEEDED FOR DAILY LIVING?: YES

## 2022-08-11 SDOH — ECONOMIC STABILITY: FOOD INSECURITY: WITHIN THE PAST 12 MONTHS, THE FOOD YOU BOUGHT JUST DIDN'T LAST AND YOU DIDN'T HAVE MONEY TO GET MORE.: OFTEN TRUE

## 2022-08-11 SDOH — ECONOMIC STABILITY: TRANSPORTATION INSECURITY
IN THE PAST 12 MONTHS, HAS THE LACK OF TRANSPORTATION KEPT YOU FROM MEDICAL APPOINTMENTS OR FROM GETTING MEDICATIONS?: YES

## 2022-08-11 ASSESSMENT — SOCIAL DETERMINANTS OF HEALTH (SDOH): HOW HARD IS IT FOR YOU TO PAY FOR THE VERY BASICS LIKE FOOD, HOUSING, MEDICAL CARE, AND HEATING?: VERY HARD

## 2022-08-11 NOTE — PATIENT INSTRUCTIONS
[address] [facility])  Call - [phone]  www.[employer] of [address] [place] [address]  Call 211 or  www. [email or web address]      Provide A Ride  [phone]    [employer]  [address].  Non-Emergency:  [address]  [address] [facility]. [address]  [phone]  Aislinn Cooper Orozco    [employer] on [address] [facility] [address] [place] [address]  [phone]  Age 65+ limited tranportation area. 24 hour notice required. Food and Meal Resources    [employer] of [address] [place] [address]  Call 211 or  www. [email or web address]    [facility]inder. [address] [facility])  Call - [phone]  www.[facility]      [employer] / [employer] on [address]nesha[facility] 79  [phone]  *regular & [address]. 3535 [facility], [address] [facility]  [phone]  *regular & special diets  60+ [facility] on [address]  [address] [place] [address]. [facility], [facility] [address]  [phone], ext. 111 [facility] on [address]. Jaboticabal, [address] [facility]  [phone]  *regular & special diets  [address], [address] [facility] and [place] only    [facility] on [address] [facility]. [address]  [place], [address]  [address]  [phone]  [facility] 109 only    [address] on [address]. [place], [address]  [phone]  61 + and/or disables   Family and Housing Resources    [employer] of [address] [place] [address]  Call 211  or - www. [employer] [facility] Community [employer] ([address] [place] [address])  Call - [phone]  www.[employer]. [employer]    [facility]  [address]  [place], [address] [place] [address] 82  18[address] [facility] [address]  [phone] /[phone]    Medicaid Application  Https://benefits. [place].gov/  3-475-457-3218    Home Energy Assistance Programs (HEAP)  [address] [place], [address] [place] [address]  [phone]    [facility] ( [facility])  [address] [facility], 1850 [facility] ([facility])  [address], [address] [facility]  [address]  wwwProfit Software    [employer]  [address] [place] [address]  [address] [facility]  [facility] Zen Sridhar [facility] [address] 2929 [facility] at [address] [facility] Family and [address]  Call 211  or - www. [facility]. [address] [place] [address])  Call - [phone]  www.[email or web address]. [employer]    [place] [address]  [address] 82  1815 [facility] [address]  [phone] /[phone]    Medicaid Application  Https://benefits. [place].gov/  [phone]    Home Energy Assistance Programs (HEAP)  [address] [place], [address] [place] [address]  [phone]    [facility] ( [facility])  [address] [facility] [address], [address] [facility] ([facility])  [address], [address] [facility]  [address]  www[employer]    [employer]  [address] [place] [address], [facility] [address]  [phone] [place] [address] for Life - Long Learning  [address]   [facility] [address] 6935 [facility]

## 2022-08-11 NOTE — PROGRESS NOTES
Subjective  Angela Mcintyre, 38 y.o. female presents today with:  Chief Complaint   Patient presents with    Check-Up     Usually sees Dr. Shane Benito. Last seen as a VV 2/15/22. Diabetes     Last A1C was 8.5 on 12/15/21. Does not check blood sugar at home. Hyperlipidemia       HPI    Patient with appointment today to follow-up on type 2 diabetes mellitus, hyperlipidemia. She typically follows with Dr. Shane Benito. Her last visit was virtually in February. Patient with type 2 diabetes mellitus. She has not been consistent with her metformin. She was having side effects from it. She was having GI upset, nausea and vomiting. She would like to be on a different medication if possible or try the extended release. She does not check her blood sugars at home. Patient also with hyperlipidemia. She is not currently on a statin. She was on pravastatin previously. Patient also with family history of cardiovascular disease and occasional chest discomfort. She states that this comes and goes. She does not currently have pain today. She states that it comes on randomly. Does not seem to be exacerbated by movement. No shortness of breath associated with it. She has not had ever had a stress test.  She is not a smoker. She is on Adderall from psychiatry. This does not seem to exacerbate it. She has no other concerns at this time. She is due for screening mammogram.    Review of Systems   Constitutional:  Negative for chills, fatigue, fever and unexpected weight change. HENT:  Negative for congestion, rhinorrhea and sore throat. Eyes:  Negative for discharge. Respiratory:  Negative for cough, shortness of breath and wheezing. Cardiovascular:  Negative for chest pain, palpitations and leg swelling. Gastrointestinal:  Negative for abdominal pain, diarrhea, nausea and vomiting. Genitourinary:  Negative for difficulty urinating. Musculoskeletal:  Negative for arthralgias and joint swelling. been on any medication since February or March. We will add metformin extended release to her regimen. We will also add in Trulicity 8.99 mg weekly. No history of pancreatitis. Check labs. She is interested in diabetes education. This been ordered. Follow-up in 2 weeks to assess how she is doing. Consider increasing dose of Trulicity at that time. Patient voiced understanding and all questions answered. Follow-up as scheduled. - Microalbumin / Creatinine Urine Ratio; Future  -  DIABETES FOOT EXAM  - Lipid, Fasting; Future  - POCT glycosylated hemoglobin (Hb A1C)  - metFORMIN (GLUCOPHAGE-XR) 500 MG extended release tablet; Take 2 tablets by mouth in the morning and at bedtime  Dispense: 360 tablet; Refill: 1  - Oanh Renee  - Comprehensive Metabolic Panel, Fasting; Future  - Dulaglutide 0.75 MG/0.5ML SOPN; Inject 0.75 mg into the skin once a week for 28 days  Dispense: 4 pen; Refill: 0    2. Hyperlipidemia, unspecified hyperlipidemia type  Add back atorvastatin as she is not currently on a statin medication. Check lipid panel. Call with results when returned. - Lipid, Fasting; Future  - atorvastatin (LIPITOR) 40 MG tablet; Take 1 tablet by mouth in the morning. Dispense: 90 tablet; Refill: 1    3. Family history of cardiovascular disorder  Patient with occasional chest discomfort and family history of cardiovascular disease. Adding back statin. Check EKG stress test.  Call with results when returned. Continue to monitor. Follow-up as scheduled. - ROUTINE EKG TREADMILL STRESS TEST; Future    4. Chest discomfort  As above  - ROUTINE EKG TREADMILL STRESS TEST; Future    5. Screening mammogram, encounter for  Mammogram ordered. - BRIANA DIGITAL SCREEN W OR WO CAD BILATERAL;  Future  Orders Placed This Encounter   Procedures    BRIANA DIGITAL SCREEN W OR WO CAD BILATERAL     Standing Status:   Future     Standing Expiration Date:   10/11/2023     Order Specific Question:   Reason for exam:     Answer:   screening mammogram    Microalbumin / Creatinine Urine Ratio     Standing Status:   Future     Standing Expiration Date:   8/11/2023    Lipid, Fasting     Standing Status:   Future     Standing Expiration Date:   8/11/2023    Lipid, Fasting     Standing Status:   Future     Standing Expiration Date:   8/11/2023    Comprehensive Metabolic Panel, Fasting     Standing Status:   Future     Standing Expiration Date:   8/11/2023    [facility] Diabetic EducationOanh     Referral Priority:   Routine     Referral Type:   Eval and Treat     Referral Reason:   Specialty Services Required     Number of Visits Requested:   1    ROUTINE EKG TREADMILL STRESS TEST     Standing Status:   Future     Standing Expiration Date:   8/11/2023     Order Specific Question:   Reason for Exam?     Answer:   Shortness of breath    POCT glycosylated hemoglobin (Hb A1C)    HM DIABETES FOOT EXAM       Orders Placed This Encounter   Medications    metFORMIN (GLUCOPHAGE-XR) 500 MG extended release tablet     Sig: Take 2 tablets by mouth in the morning and at bedtime     Dispense:  360 tablet     Refill:  1    Dulaglutide 0.75 MG/0.5ML SOPN     Sig: Inject 0.75 mg into the skin once a week for 28 days     Dispense:  4 pen     Refill:  0    atorvastatin (LIPITOR) 40 MG tablet     Sig: Take 1 tablet by mouth in the morning.      Dispense:  90 tablet     Refill:  1       Medications Discontinued During This Encounter   Medication Reason    ALPRAZolam (XANAX) 0.5 MG tablet     cloNIDine (CATAPRES) 0.1 MG tablet Therapy completed    gabapentin (NEURONTIN) 100 MG capsule Therapy completed    metFORMIN (GLUCOPHAGE-XR) 500 MG extended release tablet Therapy completed    naproxen (NAPROSYN) 500 MG tablet Therapy completed    neomycin-polymyxin-hydrocortisone (CORTISPORIN) 3.5-51725-4 otic solution Therapy completed    omeprazole (PRILOSEC) 40 MG delayed release capsule Therapy completed    pravastatin (PRAVACHOL) 20 MG tablet Therapy completed    metFORMIN (GLUCOPHAGE) 500 MG tablet Alternate therapy     Return in about 2 weeks (around 8/25/2022) for follow up. Reviewed with the patient: current clinical status,medications, activities and diet. Side effects, adverse effects of themedication prescribed today, as well as treatment plan/ rationale and result expectations have been discussed with the patient who expresses understanding and desires to proceed. Close follow up to evaluate treatment results and for coordination of care. I have reviewed the patient's medical history in detail and updated the computerized patient record. Please note, this report has been partially produced using speech recognition software and may cause  and /or contain errors related to that system including grammar, punctuation and spelling as well as words and phrases that may seem inappropriate. If there are questions or concerns please feel free to contact me to clarify.     Lance Crowell, DO

## 2022-08-15 ASSESSMENT — ENCOUNTER SYMPTOMS
VOMITING: 0
DIARRHEA: 0
WHEEZING: 0
SHORTNESS OF BREATH: 0
ABDOMINAL PAIN: 0
EYE DISCHARGE: 0
RHINORRHEA: 0
NAUSEA: 0
COUGH: 0
SORE THROAT: 0

## 2022-08-18 ENCOUNTER — TELEPHONE (OUTPATIENT)
Dept: FAMILY MEDICINE CLINIC | Age: 42
End: 2022-08-18

## 2022-08-19 DIAGNOSIS — E78.5 HYPERLIPIDEMIA, UNSPECIFIED HYPERLIPIDEMIA TYPE: ICD-10-CM

## 2022-08-19 DIAGNOSIS — E11.9 TYPE 2 DIABETES MELLITUS WITHOUT COMPLICATION, WITHOUT LONG-TERM CURRENT USE OF INSULIN (HCC): ICD-10-CM

## 2022-08-19 LAB
ALBUMIN SERPL-MCNC: 5.1 G/DL (ref 3.5–4.6)
ALP BLD-CCNC: 84 U/L (ref 40–130)
ALT SERPL-CCNC: 31 U/L (ref 0–33)
ANION GAP SERPL CALCULATED.3IONS-SCNC: 13 MEQ/L (ref 9–15)
AST SERPL-CCNC: 15 U/L (ref 0–35)
BILIRUB SERPL-MCNC: 0.3 MG/DL (ref 0.2–0.7)
BUN BLDV-MCNC: 19 MG/DL (ref 6–20)
CALCIUM SERPL-MCNC: 10 MG/DL (ref 8.5–9.9)
CHLORIDE BLD-SCNC: 97 MEQ/L (ref 95–107)
CHOLESTEROL, FASTING: 238 MG/DL (ref 0–199)
CO2: 24 MEQ/L (ref 20–31)
CREAT SERPL-MCNC: 0.74 MG/DL (ref 0.5–0.9)
GFR AFRICAN AMERICAN: >60
GFR NON-AFRICAN AMERICAN: >60
GLOBULIN: 2.1 G/DL (ref 2.3–3.5)
GLUCOSE FASTING: 253 MG/DL (ref 70–99)
HDLC SERPL-MCNC: 45 MG/DL (ref 40–59)
LDL CHOLESTEROL CALCULATED: 152 MG/DL (ref 0–129)
POTASSIUM SERPL-SCNC: 4.7 MEQ/L (ref 3.4–4.9)
SODIUM BLD-SCNC: 134 MEQ/L (ref 135–144)
TOTAL PROTEIN: 7.2 G/DL (ref 6.3–8)
TRIGLYCERIDE, FASTING: 205 MG/DL (ref 0–150)

## 2022-08-19 NOTE — TELEPHONE ENCOUNTER
PA approved. Called & spoke with patient & informed. She asked how much the medication would be & I told her that I didn't know. Told her that if it was to expensive when she went to pick it up to call back & let us know.

## 2022-08-22 NOTE — RESULT ENCOUNTER NOTE
Please inform patient that her cholesterol numbers were elevated. Her metabolic panel shows low sodium which is likely related to her high fasting glucose. We will continue to monitor all other labs. Follow-up as scheduled.   Thanks

## 2022-08-23 ENCOUNTER — HOSPITAL ENCOUNTER (OUTPATIENT)
Dept: DIABETES SERVICES | Age: 42
Setting detail: THERAPIES SERIES
Discharge: HOME | End: 2022-08-23

## 2022-08-23 PROCEDURE — G0108 DIAB MANAGE TRN  PER INDIV: HCPCS

## 2022-08-23 SDOH — ECONOMIC STABILITY: FOOD INSECURITY: ADDITIONAL INFORMATION: NO

## 2022-08-23 SDOH — ECONOMIC STABILITY: FOOD INSECURITY: ADDITIONAL INFORMATION: YES

## 2022-08-23 ASSESSMENT — PROBLEM AREAS IN DIABETES QUESTIONNAIRE (PAID)
FEELING SCARED WHEN YOU THINK ABOUT LIVING WITH DIABETES: 3
FEELING THAT DIABETES IS TAKING UP TOO MUCH OF YOUR MENTAL AND PHYSICAL ENERGY EVERY DAY: 3
PAID-5 TOTAL SCORE: 13
WORRYING ABOUT THE FUTURE AND THE POSSIBILITY OF SERIOUS COMPLICATIONS: 4
FEELING DEPRESSED WHEN YOU THINK ABOUT LIVING WITH DIABETES: 3
COPING WITH COMPLICATIONS OF DIABETES: 0

## 2022-08-23 ASSESSMENT — SLEEP AND FATIGUE QUESTIONNAIRES
HOW MANY HOURS OF SLEEP ARE YOU GETTING, ON AVERAGE: 7 OR MORE
HAVE YOU BEEN TOLD, OR NOTICED ON YOUR OWN, THAT YOU STOP BREATHING OR STRUGGLE TO BREATHE IN YOUR SLEEP: NO
HOW DO YOU RATE THE QUALITY OF YOUR SLEEP: GOOD
HAVE YOU EVER BEEN TESTED FOR SLEEP APNEA: NO

## 2022-08-23 NOTE — PROGRESS NOTES
2022    TELEHEALTH EVALUATION -- Audio/Visual (During ENXUK-92 public health emergency)    HPI:    Reno Boggs (:  1980) has requested an audio/video evaluation for the following concern(s):    Diabetes education   Diabetes Self- Management Education Program Assessment and Education Record-   Also see Diabetic Screening    Patient, Reno Boggs, has been diagnosed with  [] Type 1 [x] Type 2 diabetes. [] Patient is new to diabetes, and here for initial diabetes self-management assessment and education. [x] Patient is here for review of diabetes self-management assessment and education. Today's visit was in an [x] individual [] group setting via Zoom , educational materials were sent to her email prior to visit. Goals setting:  Initial Goal Set with Patient  [x]Yes  [] NO      MEDICAL HISTORY:  Past Medical History:   Diagnosis Date    Anxiety     Bipolar 1 disorder ([facility] [address]     Diabetes mellitus ([facility] [address]     HSV-1 (herpes simplex virus 1) infection     Human papilloma virus     Hyperlipidemia      Family History   Problem Relation Age of Onset    Diabetes Father     Ulcerative Colitis Brother     Bipolar Disorder Maternal Grandmother      Patient has no known allergies. There is no immunization history on file for this patient. Current Medications  Current Outpatient Medications   Medication Sig Dispense Refill    metFORMIN (GLUCOPHAGE-XR) 500 MG extended release tablet Take 2 tablets by mouth in the morning and at bedtime 360 tablet 1    blood glucose monitor strips Test 2 times a day & as needed for symptoms of irregular blood glucose. Dispense sufficient amount for indicated testing frequency plus additional to accommodate PRN testing needs. 100 strip 3    blood glucose monitor supplies lancets  Test 2 times a day & as needed for symptoms of irregular blood glucose. Dispense sufficient amount for indicated testing frequency plus additional to accommodate PRN testing needs.  100 each 3    blood glucose monitor kit and supplies Test 2 times a day 1 kit 0    diazePAM (VALIUM) 5 MG tablet Take 5 mg by mouth every 6 hours as needed for Anxiety. (Patient not taking: Reported on 8/11/2022)      ALPRAZolam (XANAX) 0.5 MG tablet Take 0.5 mg by mouth in the morning and 0.5 mg at noon and 0.5 mg in the evening and 0.5 mg before bedtime. Dulaglutide 0.75 MG/0.5ML SOPN Inject 0.75 mg into the skin once a week for 28 days (Patient not taking: Reported on 8/23/2022) 4 pen 0    atorvastatin (LIPITOR) 40 MG tablet Take 1 tablet by mouth in the morning. 90 tablet 1    valACYclovir (VALTREX) 1 g tablet TAKE 1 TABLET BY MOUTH THREE TIMES A DAY FOR 7 DAYS 21 tablet 0    QUEtiapine (SEROQUEL) 25 MG tablet TAKE 1 TABLET BY MOUTH NIGHTLY AS NEEDED FOR AGITATION (Patient not taking: Reported on 8/11/2022) 14 tablet 0    amphetamine-dextroamphetamine (ADDERALL, 30MG,) 30 MG tablet Take 1 tablet by mouth 2 times daily for 30 days. 60 tablet 0    lamoTRIgine (LAMICTAL) 150 MG tablet TAKE 1 TABLET BY MOUTH THREE TIMES A DAY 90 tablet 0    buPROPion (WELLBUTRIN XL) 150 MG extended release tablet Take 150 mg by mouth every morning        No current facility-administered medications for this encounter.   :     Comments:  Allergies:  No Known Allergies    Diabetes 5  / Health Status    1. A1C blood level - at goal < 7%   Lab Results   Component Value Date    LABA1C 11.6 08/11/2022    LABA1C 8.5 12/15/2021    LABA1C 6.7 (H) 05/18/2021     Lab Results   Component Value Date    GLUF 253 (H) 08/19/2022    LABMICR <1.20 05/18/2021    LDLCALC 152 (H) 08/19/2022    CREATININE 0.74 08/19/2022       2. Blood pressure (140/90)  Or less  BP Readings from Last 3 Encounters:   08/11/22 130/87   12/15/21 120/80   06/26/20 122/68       3. Cholesterol ( LDL under  100)   Lab Results   Component Value Date    LDLCALC 152 (H) 08/19/2022       4. Smoking ? []Yes   []No    5. Taking an Asprin daily?   []Yes   []No      Diabetes Self- Management Education Record    Participant Name: Amy Soto  Referring Provider: Shaheed Hendricks DO   Assessment/Evaluation Ratings:  1=Needs Instruction   4=Demonstrates Understanding/Competency  2=Needs Review   NC=Not Covered    3=Comprehends Key Points  N/A=Not Applicable    Topics/Learning Objectives Initial Assessment Date:  Comments:  Signature:   Classes 1, 2, 3 or Individual instruction Instr. Date:  Comment:   Signature:   Reinforce Date:  Comments:  Signature: Post- Education Eval date:  Comments:  Signature:   Pathophysiology of diabetes  Define diabetes & Insulin resistance Identify own type of diabetes; role of the pancreas; signs/symptoms; diagnostic criteria; prevention & treatment options; contributing factors. Assessment Ratin  22  Diabetes Education assessment completed today. Patient has:  [] No knowledge of diabetes disease process   [x] Limited knowledge of diabetes disease process  [] Good knowledge of diabetes disease process. In this session, the role of the pancreas, insulin, and the liver in glucose utilization and insulin resistance was   []  Introduced  [x]  Reviewed. [] ADA booklet Where Do I Begin used to reinforce teaching. Fern Porter RN   [] Discussed basic pathophysiology of diabetes with the group including the pancreas, insulin, insulin resistance and the liver's involvement. [] Reviewed the different types of DM, risk factors, diagnosis, symptoms and the treatment options. Evaluation rating:  Recent Health problems:  []Yes []No   Being Active  Verbalize effect of exercise on blood glucose levels; benefits of regular exercise; safety considerations; contraindications; maintenance of activity. 2022  Fern Porter RN  Rating 1  Patient current not active. Instructed on effects of activity on glycemic control and weight loss, risks, benefits and precautions. Recommend 30 minutes aerobic activity 5 times a week.       [] Reviewed effects of activity on glycemic control and weight loss, risks and benefits, and precautions. [] Current recommendations for being active by the American Diabetes Association reviewed. [] Discussed what patient is doing to stay active   Evaluation rating:  Have you made any changes in your activity level? []Yes []No       If yes, how? If yes, how many days/week? Monitoring blood glucose Identify recommended & personal blood glucose targets; importance of testing; testing supplies; HgbA1C target levels; Factors affecting blood glucose; Importance of logging blood glucose levels for pattern recognition; ketone testing; safe lancet disposal..   Rating 4   independent   [] Discussed blood glucose monitoring to include: American Diabetes Association goals for blood glucose, effects of diet, exercise and medications on test results, frequency of testing, the possible causes and appropriate action to take if hypoglycemia (15/15 rule) or hyperglycemia occurs,   importance of record keeping to share with their PCP and when to call their PCP with abnormal results. [] Also reviewed were: proper storage and handling of both the meter and test strips; proper disposal of used strips and lancets, running  checks on the test strips using control solution and loading and using lancet device to obtain an adequate sample. Suggested times were given for routine testing. To increase testing for sick day monitoring, or when a change in diabetes medication, activity, or stress occurs. []  Aware of individualized A1C goal and current A1C. [] Checking for ketones was introduced along with prevention and symptoms of Diabetic Ketoacidosis (DKA)    Evaluation rating:    Checking blood sugar    times a day. Checking before meals? []Yes  []No     Fasting ranges in last week:      Checking 2 hours post prandial? []Yes  []No     Ranges in last week:     Checking at bedtime?    []Yes  []No   Ranges in last week:     Knowledgeable of blood glucose goals? []Yes []No             Glucose meter - educate on meter use if new to monitoring. Able to use meter appropriately   Assessment Rating:N/C  22  [] Patient is new to glucose meter and instructed on the following.:   []Overview of meter - 800 number on all machines for help  []Proper storage/ handling of meter and test strips  []Washing hands, turning on meter - setting date and time  []Running  checks on the test strips using control solution   []Loading and using lancet device to obtain an adequate sample  []Obtaining blood sample and reading results on meter  []Proper disposal of used strips and lancets  []Frequency of testing  Importance of record keeping   []When to call their PCM with abnormal results  []Desired blood glucose goals for optimal control - handout given  [] All of the above    [] Patient instructed on home meter. Name of meter:    [] Patient instructed with demonstrator model in office. The patient return demonstrated   [] Verbally   [] Using his own meter:        [] Self-tested Bg:_____    Celeste Medeiros RN   See above  Evaluation rating:    Cleaning hands before testing? []Yes   []No    Using sides of fingers, not pads? []Yes   []No    Using  checks. []Yes   []No    Logging resuts? []Yes   []No   Risk Reduction - acute complications:  Identify symptoms of hyper & hypoglycemia, and prevention & treatment strategies. Assessment Ratin  22  [] Knowledgeable  [x] Limited Knowledge  [] No knowledge   of hypo or hyperglycemia. [x] Handouts reviewed covering symptoms and treatment for both. (includes Rule of 15)    Patient has [x]low []high risk for hypoglycemia. [x] Advised to carry source of fast acting glucose at all times.     []  Advised to carry ID on person identifying as having diabetes  Celeste Medeiros RN   [] Reviewed signs and symptoms of acute complications of diabetes, (hypoglycemia and hyperglycemia), possible causes and actions to take if occurs. [] Reviewed BG guidelines and troubleshooting unexpected numbers. Evaluation rating:    Knowledge of Hypo and Hyper glycemia treatment []Yes []No     Knowledge of Hypo and Hyper glycemia prevention []Yes []No    Lows since last visit? []Yes []No      Treat appropriately? []Yes  []No    Explainable? []Yes  []No      Ketone testing? []Yes []No   Risk Reduction - sick days   Describe sick day guidelines & indications for physician notification. Identify short term consequences of poor control. Assessment Ratin22  NC   [] Advised of effects of illness on blood glucose. Reviewed management of sick days with group. Advised about importance of continuing diabetes medications, tips for staying hydrated and when to call the doctor. [] Sick day handout given.     [] Sick day toolbox reviewed. Evaluation rating:    Knowledge of sick day plan? []Yes [] No   Healthy Coping:   Identify healthy and unhealthy coping, causes of stress and ways to reduce stress. How depression affects diabetes care and how to seek help if needed. Identify support needed & support network available   Assessment Ratin  22    Patient's PAID-5 Score:13    []Patient's PAID-5 (Problem Areas in Diabetes) score is less than 9. No intervention needed at this time. [x] Patient's PAID-5 (Problem Areas in Diabetes) score is greater than 8 which indicates possible diabetes related emotional distress and warrants further assessment. [x] Support given during appointment. Patient advised to follow up with PCP or psychologist.   [x] Letter will be sent to PCP indicating further assessment needed. Shelly Gitelman, RN   [] Reviewed healthy coping and unhealthy coping with the group. Discussed what ways of coping each person usually uses and brainstormed ways to change to a healthy coping mechanism with the group.      [] Stressed the importance of stress reduction and the negative effects of stress on the body including elevated BG.     [] Community resources and support networks reviewed and handout provided. Evaluation rating:    Feelings/Attitudes/Concerns? Ongoing self-management support plan? [] Yes []No   Problem solving & goal setting:  Behavior Change and goal settingIdentify 7 self-care behaviors, problem solving techniques: Finding problems, identifying solutions and taking action. Assessment Ratin  22  Discussed willingness to change behaviors and setting SMART goals. Patient [x] is willing  [] is not willing to change at this time. Esteban Graham RN [] Identified problem solving techniques: finding problems, identifying solutions (goal setting) and taking action. [] Patient reviewed selected goal set at initial assessment. Evaluation rating:    Identifying Barriers: Depression, unhealthy behaviors, financial    Healthy Eating: Describe effect of type, amount & timing of food on blood glucose; Describe basic meal planning techniques & current nutrition guideline  Correctly read food labels & demonstrate CHO counting & portion control with personalized meal plan. Identifies dining out strategies, & dietary sick day guidelines   Assessment Ratin22    Meal Plan patient is currently following:  [] plate method  [] portion control  [] carb counting   [] label reading  [x] no meal plan    [] Pages on label reading and carbohydrates and serving size from  ADA Counting Carbs given. Reviewed  portion control & label reading for carb counting utilizing booklet. [x] Advised that dietitian will recommend individualized number of carbs to eat daily, but in meantime could follow basic recommendations as follows:  [] Men-   [] for weight loss - 3-4 choices/45-60 gms per meal with 1 snack of 15 gm per day. []To maintain weight: 4-5 choices/60-75  gms pre meal with 1 snack of 15 gm per day.    [x] Women -   []weight loss 2-3 choices/30-45 gms per meal with 1 snack of 15 gm per day. [x]To maintain weight: 3-4 servings/45-60 gms pre meal with 1 snack of 15 gm per day. Patient would benefit from   [] individual appt with dietitian  [x] group MNT with dietitian    Ailyn Jones RN   Evaluation rating:    Are you following a meal plan? []Yes []No    [] Portion control   [] Plate method   [] Carb counting   [] Label reading    Weight loss since class? []Yes []No   Taking MedicationsIdentify effects of diabetes medicines on blood glucose levels; List diabetes medication taken, action & side effects   Assessment Ratin22  Handouts provided on both oral and injectable medications. Currently taking: Metformin    [] Currently takes the following complementary or alternative medicines:    [x] Reviewed medication compliance, action, side effects and timing of each. Patient is:  [x]compliant with current meds. []noncompliant with current meds. Ailyn Jones RN   [] Discussed all medication classes both oral and injectable to include method of action, side effects and precautions. [] Patient provided handout with their medications for diabetes listed showing method of action and when to take. Evaluation rating:    Any Changes in Medications? Compliant? []Yes []No     Any side effects? []Yes [] No   Insulin / Injectable - Appropriate injection sites; proper storage; supplies needed; proper technique; safe needle disposal guidelines. Assessment Ratin22    [x] Not on insulin    [] New to insulin   Patient is new to insulin and prescribed:     []  Instructed today on type of insulin(s), onset, peak and duration. []  Demonstrated proper administration technique using       []Vial & syringe       []Pen.        [] Return demonstration via          [] Self-injection  __U Site:____             [] Demonstrator        []  Reviewed recommended injection sites, proper storage of insulin, Proper needle disposal, importance of blood glucose monitoring when taking insulin, and risk of hypoglycemia. []  Handouts given.     [] Previously on insulin:  and assessed the following:    [] Knowledge of type of insulin - onset, peak and duration of each type    [] Demonstrates Competency      [] Education provided      [] Proper storage of insulin:     [] Demonstrates Competency      [] Education provided          [] Site Management        [] Demonstrates Competency        [] Education provided      [] Injection site currently uses:     [] Evidence of:        [] bruising       [] infection       [] lipoatrophy        [] lipohypertrophy.      [] Injecting near umbilicus or scars/tattoos    [] Rotates sites appropriately    [] Skin Preparation technique:          [] Injection Procedure:       [] Demonstrates Competency        [] Education provided   [] Sterile Technique   [] Rolling to uniformity (if necessary)   [] Pre-injection priming (pen only)   [] Air into vial (Syringe only)   [] Correct order for mixing in same syringe (if necessary)   [] Dosing accuracy   [] Needle insertion   [] Complete injection -    [] Needle withdrawal - with waiting time before removing needle          [] Disposal Procedure:       [] Demonstrates Competency        [] Education provided    [] Has sharps container /needle clip    [] Uses sharps container / needle clip          [] Injection Device Selection:       [] Demonstrates Competency       [] Education provided    [] Appropriate needle size    [] Sufficient volume    [] Appropriate dosing increments    [] Suited to physical limitations           [] Injection adherence:       [] Demonstrates Competency        [] Education provided     [] Misses doses:         [] Daily [] Weekly          [] Monthly [] Almost Never         [] Hypoglycemia symptoms & treatment:      [] Demonstrates Competency        [] Education provided    Tiago Mckee RN   [] Discussed insulin stigma and proper technique including site selection and self-injection. []  Reviewed both pen and vial/syringe use. Discussed needle disposal and proper insulin storage and importance of times to take insulin. [] Discussed importance of blood glucose monitoring to assess current treatment effectiveness. Evaluation rating: On Insulin? []Yes []No           Injection site used: __________     Rotating sites? []Yes [] No     Problems? []Yes []No      Storing insulin correctly? []Yes [] No     Injecting at proper times? []Yes []No   IRisk Reduction - chronic complications:  Describe the relationship of blood glucose levels to long term complications of diabetes. Identify preventative measures & standards of care. Assessment Ratin22  NC  See Diabetes Assessment for last completed chronic complication prevention appointments. [] Patient is up-to-date on preventative exams and vaccines  [x] Patient is not up-to date on preventative exams and vaccines and advised to discuss with PCM    [x] Blood pressure is at goal  [] Blood pressure is not at goal    [] Cholesterol is at goal  [x] Cholesterol is not at goal    [] Has microvascular complications  [] Has macrovascular complications  Margarito Maradiaga RN   [] Reviewed natural course of T2DM with group and how chronic complications are related to elevated blood glucose. [] Discussed macro and microvascular complications and the need for control of lipids, blood pressure, glucose levels, weight reduction and smoking cessation to help reduce risks. [] Instructed on goals for blood pressure, lipids, and glucose for optimal health. [] Individualized scorecard provided with current   health maintenance recommendations from the American Diabetes Association to include Eye, dental exams, foot exams, recommended labs and vaccines for people with diabetes. Evaluation rating:    Using Score card?    [] Yes []No    Blood Pressure at goal?  [] Yes []No    Foot exams:  self-exams daily  []Yes []No  Provider yearly   []  Yes []No               Eye and dental exams up to date? []Yes []No               Labs completed & at goal  [] Yes []No        Plan if not at goal? []Yes []No  Immunizations   [] Flu   []pneumonia   []Hep B (19-59)   []Covid   Individualized goal selection. 08/23/22  My goal , to help me improve my health, I will: 1. Check blood sugar 3 times a day before meals      2. Take Metformin 2 tabs twice daily as prescribed     Yolande Romero RN Percentage of goal completed from Initial Assessment:   %      2.    %    New revised goal: Percentage of goal completed:  1.      2.    New revised goal: Percentage of goal completed since end of class:  1.      2.    New revised goal:     Plan  Follow-up Appointments planned via phone call         Instruction Method: [x]Lecture/Discussion  []Power Point Presentation  [x]Handouts  []Return Demonstration      Education Materials/Equipment Provided:      [x] Self-Management  - Initial Assessment - Where do I Begin booklet and Counting Carbs from the ADA. [] Self-Management  Class 1 - On the Road to better managing your diabetes - Packet of Handouts from Diabetes Department    [] Self-Management  Class 2 - Meal Plan,  Choose your Foods - Food Lists for [employer] and Nutrition in the [employer] - fast facts about fast food    [] Self-Management  Class 3 -  Diabetes ID card,  foot care tips sheet,  Continuing Your Journey with Diabetes, Individualized Diabetes report card, Sick Day Rules, Diabetes Cookbooks, Magazines and Pedometers when available    [] Glucose Meter     [] BD Getting Started Insulin Kit     [] Other      Encounter Type Date Start Time End Time Comments No Show Dates   Assessment 08/23/22  Yolande Romero RN [phone] 08/23/22    [] Patient has no barriers to learning and recommend attending classes.   Classes scheduled for:     [x] Patient has the following barriers to learning and would benefit from additional education in an individual setting. Barriers:transportation       [] Will follow up:    [] Patient declines classes at this time. [x] Will follow up:2-3 weeks via phone call     Stacie Lam RN    Class 1 - Understanding diabetes      [] First class completed today. Class 2- Nutrition and diabetes        [] Second class completed today    Class 3 - Preventing Complications     [] Third class completed today. [] Patient has completed all 3 classes today. Goal sheets and DSMS Support Plan completed. Will follow up in 3-4 months via telephone. Patient advised to contact Diabetes Education office if any questions. Number provided. [] Patient needs to attend Class #    in order to complete classes. Scheduled for: Individual MNT         3 Month Follow-up      []In Person  []Telephone    Meter Instrx        Insulin Instrx      []Pen  []Vial & Syringe      DSMS Support Plan:  Follow-up plan:    [] Healthy Coping  [] Emotional Support  [] National [place] [employer] Mental Illness (CONSTANCE) - (Depression, bipolar and other support) 639.962.2259; www.constance.org    [] [address] [place] [address]  312.918.4817; www.dbsalliance. org                          [] National Suicide Prevention Vhnqrvrv-768-299-8255                          [] Anxiety & Depression Association of [place]                               Find local support groups by zip code at [facility] number 028-563-6322                          [] Counseling:  ______________________________  [] Diabetes Support Groups  [] New Weigh of Life at [facility]  2nd Tuesday of the month at 10:00 [phone]  [] On-line support groups (1234ENTER, [employer]ie[employer], ADA)  [] [facility]  [] I would be interested in a support group at [address] in [facility] if offered    [] Stress Relief     [] Yoga Class     [] Start a http://affordableinsulinproject.org/    []  Diabetes Websites - Use as a resource for additional information  [] www.diabetes. org  [] [employer]  [] WWW.diabeteseducator. org     Association of Diabetes Care & Education Specialists  [] www.niddk.nih.gov/health-information/diabetes/overview  [address]  [] www.medicalert. org  - offers emergency medical information service, including an ID bracelet/pendant (have to pay)    []   Journals/Magazines  [] Diabetes Forecast 0-060-995-8228; www.diabetesforecast.org  [] Diabetes Self-Management 6-596-834-689.401.5748; www.diabetesselMediConecta.com  [] Diabetes Health 534-846-9156; www.diabeteshealth. com    []   Other  [] Health Program offered at place of employment   [] [employer]'s Chronic Disease Management Program  [] Follow up - Diabetes Education or Nutrition Therapy Annually (call in 1 year to set up apt)  [] Join the Living with Type 2 Diabetes Program (FREE)   www.diabetes. org/diabetes/type-2/living-with-type-2-diabetes-program  or call 1-800-DIABETES  [] Stop smoking - www.cancer. org/healthy/stay-away-from-tobacco       Post Education Referrals:        [] [place] Tobacco Quit information sheet and [address] [facility] , [address] [place] [address]      [] Dental care     [] Podiatrist     [] Ophthalmologist     [] Other    MISAEL Peoples, was evaluated through a synchronous (real-time) audio-video encounter. The patient (or guardian if applicable) is aware that this is a billable service, which includes applicable co-pays. This Virtual Visit was conducted with patient's (and/or legal guardian's) consent. The visit was conducted pursuant to the emergency declaration under the [facility] [address] authority and the [employer] General Act. Patient identification was verified, and a caregiver was present when appropriate.    The patient was located at Home: Patti Mcnamara KAROLINA Chavis [address] [place] [id number]. Provider was located at [facility] (Appt Dept): [address]. Total time spent on this encounter:  1 hour    --Yolande Romero RN on 8/23/2022 at 3:43 PM    An electronic signature was used to authenticate this note.

## 2022-08-31 ENCOUNTER — TELEMEDICINE (OUTPATIENT)
Dept: FAMILY MEDICINE CLINIC | Age: 42
End: 2022-08-31
Payer: COMMERCIAL

## 2022-08-31 DIAGNOSIS — E11.9 TYPE 2 DIABETES MELLITUS WITHOUT COMPLICATION, WITHOUT LONG-TERM CURRENT USE OF INSULIN (HCC): Primary | ICD-10-CM

## 2022-08-31 DIAGNOSIS — E78.5 HYPERLIPIDEMIA, UNSPECIFIED HYPERLIPIDEMIA TYPE: ICD-10-CM

## 2022-08-31 PROCEDURE — 99443 PR PHYS/QHP TELEPHONE EVALUATION 21-30 MIN: CPT | Performed by: STUDENT IN AN ORGANIZED HEALTH CARE EDUCATION/TRAINING PROGRAM

## 2022-08-31 RX ORDER — GLIPIZIDE 5 MG/1
5 TABLET ORAL 2 TIMES DAILY
Qty: 60 TABLET | Refills: 2 | Status: SHIPPED | OUTPATIENT
Start: 2022-08-31 | End: 2022-09-23

## 2022-08-31 RX ORDER — ATORVASTATIN CALCIUM 80 MG/1
80 TABLET, FILM COATED ORAL DAILY
Qty: 90 TABLET | Refills: 2 | Status: SHIPPED | OUTPATIENT
Start: 2022-08-31

## 2022-08-31 ASSESSMENT — ENCOUNTER SYMPTOMS
RHINORRHEA: 0
SHORTNESS OF BREATH: 0
COUGH: 0
VOMITING: 0
DIARRHEA: 0
EYE DISCHARGE: 0
ABDOMINAL PAIN: 0
SORE THROAT: 0
NAUSEA: 0
WHEEZING: 0

## 2022-08-31 NOTE — PROGRESS NOTES
Alyce Paez (:  1980) is a Established patient, here for evaluation of the following:    Assessment & Plan   Below is the assessment and plan developed based on review of pertinent history, physical exam, labs, studies, and medications. 1. Type 2 diabetes mellitus without complication, without long-term current use of insulin (HCC)  -     glipiZIDE (GLUCOTROL) 5 MG tablet; Take 1 tablet by mouth 2 times daily, Disp-60 tablet, R-2Normal  Not currently controlled. We will add glipizide to her current treatment regimen. We will start at 5 mg twice daily. Will increase dose if needed. Follow-up in 2 months for repeat A1c. Patient voiced understanding. All questions answered. Continue to check blood sugars. Continue with diabetes education. If side effects develop including low blood sugar she will let us know and we will switch to Jardiance. 2. Hyperlipidemia, unspecified hyperlipidemia type  -     atorvastatin (LIPITOR) 80 MG tablet; Take 1 tablet by mouth daily, Disp-90 tablet, R-2Normal  Stable, chronic. Will increase dose of atorvastatin to 80 mg daily. She was unable to get a 40 mg due to cost.  This does seem to be a more cost effective version. New prescription sent to the pharmacy. Continue to monitor. Follow-up as scheduled. No follow-ups on file. Subjective   Chief Complaint   Patient presents with    Follow-up    Diabetes     Checks blood sugar twice a day. Is not improving. States she was not able to get the Trulicity due to the price. Would like to see if something else can be prescribed. Hyperlipidemia     Has not started medication yet. Results     Would like to review results. HPI    Patient with follow-up appointment for type 2 diabetes mellitus. She is currently taking metformin 1000 mg twice a day. She is tolerating well. She is checking her blood sugar twice daily. She states that it continues to run high.   She was not able to get Trulicity due to the price. She is wanting to get back on glipizide. We discussed this as well as other medications. She states that she did well on glipizide previously. She is looking for something that is little cost.  She is doing diabetes education as well. Patient also with hyperlipidemia. She is started on atorvastatin 40 mg daily. She states that this was expensive from the pharmacy. She is requesting an alternative. She has no other concerns at this time. Review of Systems   Constitutional:  Negative for chills, fatigue, fever and unexpected weight change. HENT:  Negative for congestion, rhinorrhea and sore throat. Eyes:  Negative for discharge. Respiratory:  Negative for cough, shortness of breath and wheezing. Cardiovascular:  Negative for chest pain, palpitations and leg swelling. Gastrointestinal:  Negative for abdominal pain, diarrhea, nausea and vomiting. Genitourinary:  Negative for difficulty urinating. Musculoskeletal:  Negative for arthralgias and joint swelling. Skin:  Negative for rash. Neurological:  Negative for weakness, light-headedness, numbness and headaches. Psychiatric/Behavioral:  Negative for confusion and suicidal ideas. The patient is not nervous/anxious. Objective   Patient-Reported Vitals  No data recorded     Physical Exam  Due to nature of telephonic visit, unable to complete full physical exam at this time, patient does not sound short breath while talking. On this date 8/31/2022 I have spent 23 minutes reviewing previous notes, test results and face to face (virtual) with the patient discussing the diagnosis and importance of compliance with the treatment plan as well as documenting on the day of the visit. Archie Castañeda, was evaluated through a synchronous (real-time) audio-video encounter. The patient (or guardian if applicable) is aware that this is a billable service, which includes applicable co-pays.  This Virtual Visit was conducted with patient's (and/or legal guardian's) consent. The visit was conducted pursuant to the emergency declaration under the [facility][note 3][address] authority and the [employer] General Act. Patient identification was verified, and a caregiver was present when appropriate. The patient was located at Home: Patti [address] [address]. Provider was located at [facility] (Appt Dept): [address] [facility],  [address] [place] [address].         --Stefani Banda DO

## 2022-09-23 DIAGNOSIS — E11.9 TYPE 2 DIABETES MELLITUS WITHOUT COMPLICATION, WITHOUT LONG-TERM CURRENT USE OF INSULIN (HCC): ICD-10-CM

## 2022-09-23 RX ORDER — GLIPIZIDE 5 MG/1
TABLET ORAL
Qty: 60 TABLET | Refills: 2 | Status: SHIPPED | OUTPATIENT
Start: 2022-09-23

## 2022-12-23 DIAGNOSIS — E11.9 TYPE 2 DIABETES MELLITUS WITHOUT COMPLICATION, WITHOUT LONG-TERM CURRENT USE OF INSULIN (HCC): ICD-10-CM

## 2022-12-23 RX ORDER — GLIPIZIDE 5 MG/1
TABLET ORAL
Qty: 180 TABLET | Refills: 1 | Status: SHIPPED | OUTPATIENT
Start: 2022-12-23

## 2023-06-19 DIAGNOSIS — E11.9 TYPE 2 DIABETES MELLITUS WITHOUT COMPLICATION, WITHOUT LONG-TERM CURRENT USE OF INSULIN (HCC): ICD-10-CM

## 2023-06-19 RX ORDER — METFORMIN HYDROCHLORIDE 500 MG/1
1000 TABLET, EXTENDED RELEASE ORAL 2 TIMES DAILY
Qty: 360 TABLET | Refills: 0 | Status: SHIPPED | OUTPATIENT
Start: 2023-06-19

## 2023-09-10 DIAGNOSIS — E11.9 TYPE 2 DIABETES MELLITUS WITHOUT COMPLICATION, WITHOUT LONG-TERM CURRENT USE OF INSULIN (HCC): ICD-10-CM

## 2023-09-23 RX ORDER — METFORMIN HYDROCHLORIDE 500 MG/1
1000 TABLET, EXTENDED RELEASE ORAL 2 TIMES DAILY
Qty: 360 TABLET | Refills: 0 | OUTPATIENT
Start: 2023-09-23

## 2023-11-20 ENCOUNTER — OFFICE VISIT (OUTPATIENT)
Dept: FAMILY MEDICINE CLINIC | Age: 43
End: 2023-11-20
Payer: COMMERCIAL

## 2023-11-20 VITALS
BODY MASS INDEX: 24.83 KG/M2 | DIASTOLIC BLOOD PRESSURE: 86 MMHG | SYSTOLIC BLOOD PRESSURE: 124 MMHG | HEART RATE: 100 BPM | TEMPERATURE: 97.8 F | OXYGEN SATURATION: 98 % | WEIGHT: 163.8 LBS | HEIGHT: 68 IN

## 2023-11-20 DIAGNOSIS — E11.9 TYPE 2 DIABETES MELLITUS WITHOUT COMPLICATION, WITHOUT LONG-TERM CURRENT USE OF INSULIN (HCC): Primary | ICD-10-CM

## 2023-11-20 DIAGNOSIS — Z86.19 HX OF HERPES GENITALIS: ICD-10-CM

## 2023-11-20 DIAGNOSIS — R42 VERTIGO: ICD-10-CM

## 2023-11-20 DIAGNOSIS — Z12.31 SCREENING MAMMOGRAM, ENCOUNTER FOR: ICD-10-CM

## 2023-11-20 DIAGNOSIS — E78.5 HYPERLIPIDEMIA, UNSPECIFIED HYPERLIPIDEMIA TYPE: ICD-10-CM

## 2023-11-20 LAB — HBA1C MFR BLD: 10.5 %

## 2023-11-20 PROCEDURE — 83036 HEMOGLOBIN GLYCOSYLATED A1C: CPT | Performed by: STUDENT IN AN ORGANIZED HEALTH CARE EDUCATION/TRAINING PROGRAM

## 2023-11-20 PROCEDURE — 99214 OFFICE O/P EST MOD 30 MIN: CPT | Performed by: STUDENT IN AN ORGANIZED HEALTH CARE EDUCATION/TRAINING PROGRAM

## 2023-11-20 PROCEDURE — 3046F HEMOGLOBIN A1C LEVEL >9.0%: CPT | Performed by: STUDENT IN AN ORGANIZED HEALTH CARE EDUCATION/TRAINING PROGRAM

## 2023-11-20 RX ORDER — METFORMIN HYDROCHLORIDE 500 MG/1
1000 TABLET, EXTENDED RELEASE ORAL 2 TIMES DAILY
Qty: 360 TABLET | Refills: 1 | Status: SHIPPED | OUTPATIENT
Start: 2023-11-20

## 2023-11-20 RX ORDER — MECLIZINE HYDROCHLORIDE 25 MG/1
25 TABLET ORAL 3 TIMES DAILY PRN
Qty: 30 TABLET | Refills: 1 | Status: SHIPPED | OUTPATIENT
Start: 2023-11-20 | End: 2023-11-30

## 2023-11-20 RX ORDER — ATORVASTATIN CALCIUM 20 MG/1
20 TABLET, FILM COATED ORAL DAILY
Qty: 90 TABLET | Refills: 3 | Status: SHIPPED | OUTPATIENT
Start: 2023-11-20

## 2023-11-20 RX ORDER — VALACYCLOVIR HYDROCHLORIDE 1 G/1
TABLET, FILM COATED ORAL
Qty: 21 TABLET | Refills: 2 | Status: SHIPPED | OUTPATIENT
Start: 2023-11-20

## 2023-11-20 SDOH — ECONOMIC STABILITY: INCOME INSECURITY: HOW HARD IS IT FOR YOU TO PAY FOR THE VERY BASICS LIKE FOOD, HOUSING, MEDICAL CARE, AND HEATING?: NOT VERY HARD

## 2023-11-20 SDOH — ECONOMIC STABILITY: FOOD INSECURITY: WITHIN THE PAST 12 MONTHS, YOU WORRIED THAT YOUR FOOD WOULD RUN OUT BEFORE YOU GOT MONEY TO BUY MORE.: OFTEN TRUE

## 2023-11-20 SDOH — ECONOMIC STABILITY: FOOD INSECURITY: WITHIN THE PAST 12 MONTHS, THE FOOD YOU BOUGHT JUST DIDN'T LAST AND YOU DIDN'T HAVE MONEY TO GET MORE.: OFTEN TRUE

## 2023-11-20 SDOH — ECONOMIC STABILITY: HOUSING INSECURITY
IN THE LAST 12 MONTHS, WAS THERE A TIME WHEN YOU DID NOT HAVE A STEADY PLACE TO SLEEP OR SLEPT IN A SHELTER (INCLUDING NOW)?: NO

## 2023-11-20 ASSESSMENT — PATIENT HEALTH QUESTIONNAIRE - PHQ9
3. TROUBLE FALLING OR STAYING ASLEEP: NEARLY EVERY DAY
4. FEELING TIRED OR HAVING LITTLE ENERGY: 3
5. POOR APPETITE OR OVEREATING: 0
1. LITTLE INTEREST OR PLEASURE IN DOING THINGS: NEARLY EVERY DAY
9. THOUGHTS THAT YOU WOULD BE BETTER OFF DEAD, OR OF HURTING YOURSELF: 0
10. IF YOU CHECKED OFF ANY PROBLEMS, HOW DIFFICULT HAVE THESE PROBLEMS MADE IT FOR YOU TO DO YOUR WORK, TAKE CARE OF THINGS AT HOME, OR GET ALONG WITH OTHER PEOPLE: EXTREMELY DIFFICULT
SUM OF ALL RESPONSES TO PHQ QUESTIONS 1-9: 13
6. FEELING BAD ABOUT YOURSELF - OR THAT YOU ARE A FAILURE OR HAVE LET YOURSELF OR YOUR FAMILY DOWN: 1
7. TROUBLE CONCENTRATING ON THINGS, SUCH AS READING THE NEWSPAPER OR WATCHING TELEVISION: NOT AT ALL
3. TROUBLE FALLING OR STAYING ASLEEP: 3
SUM OF ALL RESPONSES TO PHQ QUESTIONS 1-9: 13
1. LITTLE INTEREST OR PLEASURE IN DOING THINGS: 3
SUM OF ALL RESPONSES TO PHQ9 QUESTIONS 1 & 2: 5
SUM OF ALL RESPONSES TO PHQ QUESTIONS 1-9: 13
2. FEELING DOWN, DEPRESSED OR HOPELESS: 2
SUM OF ALL RESPONSES TO PHQ QUESTIONS 1-9: 13
SUM OF ALL RESPONSES TO PHQ QUESTIONS 1-9: 13
9. THOUGHTS THAT YOU WOULD BE BETTER OFF DEAD, OR OF HURTING YOURSELF: NOT AT ALL
10. IF YOU CHECKED OFF ANY PROBLEMS, HOW DIFFICULT HAVE THESE PROBLEMS MADE IT FOR YOU TO DO YOUR WORK, TAKE CARE OF THINGS AT HOME, OR GET ALONG WITH OTHER PEOPLE: 3
5. POOR APPETITE OR OVEREATING: NOT AT ALL
7. TROUBLE CONCENTRATING ON THINGS, SUCH AS READING THE NEWSPAPER OR WATCHING TELEVISION: 0
8. MOVING OR SPEAKING SO SLOWLY THAT OTHER PEOPLE COULD HAVE NOTICED. OR THE OPPOSITE - BEING SO FIDGETY OR RESTLESS THAT YOU HAVE BEEN MOVING AROUND A LOT MORE THAN USUAL: SEVERAL DAYS
4. FEELING TIRED OR HAVING LITTLE ENERGY: NEARLY EVERY DAY
6. FEELING BAD ABOUT YOURSELF - OR THAT YOU ARE A FAILURE OR HAVE LET YOURSELF OR YOUR FAMILY DOWN: SEVERAL DAYS
8. MOVING OR SPEAKING SO SLOWLY THAT OTHER PEOPLE COULD HAVE NOTICED. OR THE OPPOSITE, BEING SO FIGETY OR RESTLESS THAT YOU HAVE BEEN MOVING AROUND A LOT MORE THAN USUAL: 1
2. FEELING DOWN, DEPRESSED OR HOPELESS: MORE THAN HALF THE DAYS

## 2023-11-20 ASSESSMENT — ENCOUNTER SYMPTOMS
NAUSEA: 0
WHEEZING: 0
DIARRHEA: 0
COUGH: 0
SORE THROAT: 0
RHINORRHEA: 0
SHORTNESS OF BREATH: 0
VOMITING: 0
ABDOMINAL PAIN: 0

## 2023-11-20 NOTE — PROGRESS NOTES
with results  - BRIANA DIGITAL SCREEN W OR WO CAD BILATERAL; Future    4. Hx of herpes genitalis  Stable, chronic. Valtrex prescription refilled  - valACYclovir (VALTREX) 1 g tablet; TAKE 1 TABLET BY MOUTH THREE TIMES A DAY FOR 7 DAYS  Dispense: 21 tablet; Refill: 2    5. Vertigo  Not currently controlled. May try epley maneuvers. Meclizine as needed. Prescription sent to the pharmacy. Continue to monitor. Follow-up as scheduled. If symptoms worsen or change, return to care sooner. - meclizine (ANTIVERT) 25 MG tablet; Take 1 tablet by mouth 3 times daily as needed for Dizziness  Dispense: 30 tablet;  Refill: 1    Orders Placed This Encounter   Procedures    BRIANA DIGITAL SCREEN W OR WO CAD BILATERAL     Standing Status:   Future     Standing Expiration Date:   1/20/2025     Order Specific Question:   Reason for exam:     Answer:   screening mammogram    Microalbumin / Creatinine Urine Ratio     Standing Status:   Future     Standing Expiration Date:   11/20/2024    Lipid, Fasting     Standing Status:   Future     Standing Expiration Date:   11/20/2024    POCT glycosylated hemoglobin (Hb A1C)     Orders Placed This Encounter   Medications    valACYclovir (VALTREX) 1 g tablet     Sig: TAKE 1 TABLET BY MOUTH THREE TIMES A DAY FOR 7 DAYS     Dispense:  21 tablet     Refill:  2    metFORMIN (GLUCOPHAGE-XR) 500 MG extended release tablet     Sig: Take 2 tablets by mouth in the morning and at bedtime     Dispense:  360 tablet     Refill:  1    atorvastatin (LIPITOR) 20 MG tablet     Sig: Take 1 tablet by mouth daily     Dispense:  90 tablet     Refill:  3    meclizine (ANTIVERT) 25 MG tablet     Sig: Take 1 tablet by mouth 3 times daily as needed for Dizziness     Dispense:  30 tablet     Refill:  1     Medications Discontinued During This Encounter   Medication Reason    atorvastatin (LIPITOR) 80 MG tablet Therapy completed    blood glucose monitor supplies LIST CLEANUP    glipiZIDE (GLUCOTROL) 5 MG tablet Therapy

## 2023-11-27 ENCOUNTER — PATIENT MESSAGE (OUTPATIENT)
Dept: FAMILY MEDICINE CLINIC | Age: 43
End: 2023-11-27

## 2023-11-27 DIAGNOSIS — H92.09 OTALGIA, UNSPECIFIED LATERALITY: ICD-10-CM

## 2023-11-27 DIAGNOSIS — R42 VERTIGO: Primary | ICD-10-CM

## 2023-11-29 NOTE — TELEPHONE ENCOUNTER
Please inform patient that I placed a referral to ENT, Dr. Cathie Talbot at [facility].  Thanks

## 2024-01-08 ENCOUNTER — OFFICE VISIT (OUTPATIENT)
Age: 44
End: 2024-01-08
Payer: COMMERCIAL

## 2024-01-08 VITALS
BODY MASS INDEX: 25.76 KG/M2 | HEIGHT: 68 IN | SYSTOLIC BLOOD PRESSURE: 118 MMHG | WEIGHT: 170 LBS | TEMPERATURE: 96.8 F | DIASTOLIC BLOOD PRESSURE: 78 MMHG

## 2024-01-08 DIAGNOSIS — H93.13 TINNITUS OF BOTH EARS: ICD-10-CM

## 2024-01-08 DIAGNOSIS — R42 DIZZINESS: Primary | ICD-10-CM

## 2024-01-08 PROCEDURE — 99243 OFF/OP CNSLTJ NEW/EST LOW 30: CPT | Performed by: STUDENT IN AN ORGANIZED HEALTH CARE EDUCATION/TRAINING PROGRAM

## 2024-01-08 RX ORDER — MECLIZINE HYDROCHLORIDE 25 MG/1
TABLET ORAL
COMMUNITY
Start: 2023-12-19

## 2024-01-08 NOTE — PROGRESS NOTES
[facility] [place] [facility], [facility] OTOLARYNGOLOGY  [address] [place] [address], [address]  [facility] [address]  Dept: [phone]  Dept Fax: [phone]  Loc: [phone]     1/8/2024    Visit type: New patient    Reason for Visit: Dizziness       ASSESSMENT/PLAN   1. Dizziness  -     [facility] - [place]  -     Amb External Referral To Neurology  2. Tinnitus of both ears  -     External Referral to Audiology    No follow-ups on file.  No orders of the defined types were placed in this encounter.     Subjective    Patient: Lizzie Fields is a 43 y.o. female   HPI:    Referred by: Rachael Yanez DO    Presents today for evaluation of dizziness    I have personally reviewed the note by PCP on 11/20/23  \"  Dizziness   States she has been having constant dizziness for the past month. Has been mostly laying in bed to deal with it. States she has tried doing exercises for Vertigo, has used OTC ear drops from [employer] for Vertigo, has used peroxide in the ears & OTC dramamine with no relief. \"    Denies room spinning.   Helps by laying down. (As compared to walking). Closing eyes helps.   Denies history of migraines.  Feels like she is on a roller coaster. Feels like she's on a boat.  +Headaches   Feels like she veers off to the right.  Endorses tinnitus- bilateral, occasional, high pitched tone.   Denies hearing changes   Denies otalgia, otorrhea.   Causes nausea/vomitting.    Meclizine- uses couple times per day for the last 3 months.    Remainder ROS negative for complaint      No Known Allergies    Current Outpatient Medications:     meclizine (ANTIVERT) 25 MG tablet, TAKE 1 TABLET BY MOUTH 3 TIMES A DAY AS NEEDED FOR DIZZINESS, Disp: , Rfl:     metFORMIN (GLUCOPHAGE-XR) 500 MG extended release tablet, Take 2 tablets by mouth in the morning and at bedtime, Disp: 360 tablet, Rfl: 1    diazePAM (VALIUM) 5 MG tablet, Take 1 tablet by mouth every 6 hours as needed for

## 2024-05-20 DIAGNOSIS — R42 DIZZINESS AND GIDDINESS: ICD-10-CM

## 2024-05-21 RX ORDER — MECLIZINE HYDROCHLORIDE 25 MG/1
TABLET ORAL
Qty: 30 TABLET | Refills: 0 | Status: SHIPPED | OUTPATIENT
Start: 2024-05-21

## 2024-05-30 ENCOUNTER — TELEPHONE (OUTPATIENT)
Dept: DIABETES SERVICES | Age: 44
End: 2024-05-30

## 2024-07-02 ENCOUNTER — TELEPHONE (OUTPATIENT)
Dept: FAMILY MEDICINE CLINIC | Age: 44
End: 2024-07-02

## 2024-09-12 ENCOUNTER — TELEPHONE (OUTPATIENT)
Dept: FAMILY MEDICINE CLINIC | Age: 44
End: 2024-09-12

## 2024-09-12 ENCOUNTER — TELEPHONE (OUTPATIENT)
Dept: GASTROENTEROLOGY | Age: 44
End: 2024-09-12

## 2024-12-20 DIAGNOSIS — E11.9 TYPE 2 DIABETES MELLITUS WITHOUT COMPLICATION, WITHOUT LONG-TERM CURRENT USE OF INSULIN (HCC): ICD-10-CM

## 2024-12-20 RX ORDER — METFORMIN HYDROCHLORIDE 500 MG/1
1000 TABLET, EXTENDED RELEASE ORAL 2 TIMES DAILY
Qty: 360 TABLET | Refills: 1 | OUTPATIENT
Start: 2024-12-20

## 2025-01-27 DIAGNOSIS — E11.9 TYPE 2 DIABETES MELLITUS WITHOUT COMPLICATION, WITHOUT LONG-TERM CURRENT USE OF INSULIN (HCC): ICD-10-CM

## 2025-01-28 RX ORDER — METFORMIN HYDROCHLORIDE 500 MG/1
1000 TABLET, EXTENDED RELEASE ORAL 2 TIMES DAILY
Qty: 360 TABLET | Refills: 0 | OUTPATIENT
Start: 2025-01-28

## 2025-01-29 RX ORDER — METFORMIN HYDROCHLORIDE 500 MG/1
1000 TABLET, EXTENDED RELEASE ORAL 2 TIMES DAILY
Qty: 120 TABLET | Refills: 0 | Status: SHIPPED | OUTPATIENT
Start: 2025-01-29

## 2025-01-29 NOTE — TELEPHONE ENCOUNTER
Patient requesting medication refill. Please approve or deny this request.  Patient states she is out of Medication. Patient scheduled first available appointment 2/25/25. Patient inquiring if Prescription can be sent in before her appointment. LOV:11/20/23 NOV:2/25/25    Rx requested:  Requested Prescriptions     Pending Prescriptions Disp Refills    metFORMIN (GLUCOPHAGE-XR) 500 MG extended release tablet 360 tablet 1     Sig: Take 2 tablets by mouth in the morning and at bedtime     Refused Prescriptions Disp Refills    metFORMIN (GLUCOPHAGE-XR) 500 MG extended release tablet 360 tablet 0     Sig: Take 2 tablets by mouth in the morning and at bedtime     Refused By: SHI CRUZ     Reason for Refusal: Patient needs an appointment         Last Office Visit:   11/20/2023      Next Visit Date:  Future Appointments   Date Time Provider Department Center   2/25/2025  1:30 PM Rachael Yanez DO [facility] DEP

## 2025-02-25 ENCOUNTER — OFFICE VISIT (OUTPATIENT)
Age: 45
End: 2025-02-25
Payer: MEDICAID

## 2025-02-25 VITALS
HEART RATE: 110 BPM | SYSTOLIC BLOOD PRESSURE: 98 MMHG | HEIGHT: 68 IN | DIASTOLIC BLOOD PRESSURE: 68 MMHG | WEIGHT: 161 LBS | TEMPERATURE: 97.2 F | BODY MASS INDEX: 24.4 KG/M2 | OXYGEN SATURATION: 97 %

## 2025-02-25 DIAGNOSIS — Z12.11 SCREENING FOR COLON CANCER: ICD-10-CM

## 2025-02-25 DIAGNOSIS — R42 VERTIGO: ICD-10-CM

## 2025-02-25 DIAGNOSIS — E11.9 TYPE 2 DIABETES MELLITUS WITHOUT COMPLICATION, WITHOUT LONG-TERM CURRENT USE OF INSULIN (HCC): Primary | ICD-10-CM

## 2025-02-25 DIAGNOSIS — E78.5 HYPERLIPIDEMIA, UNSPECIFIED HYPERLIPIDEMIA TYPE: ICD-10-CM

## 2025-02-25 DIAGNOSIS — F31.9 BIPOLAR 1 DISORDER (HCC): ICD-10-CM

## 2025-02-25 DIAGNOSIS — Z12.31 SCREENING MAMMOGRAM FOR BREAST CANCER: ICD-10-CM

## 2025-02-25 LAB
CREAT UR-MCNC: 47 MG/DL
HBA1C MFR BLD: 12.4 %
MICROALBUMIN UR-MCNC: <1.2 MG/DL
MICROALBUMIN/CREAT UR-RTO: NORMAL MG/G (ref 0–30)

## 2025-02-25 PROCEDURE — 99213 OFFICE O/P EST LOW 20 MIN: CPT | Performed by: STUDENT IN AN ORGANIZED HEALTH CARE EDUCATION/TRAINING PROGRAM

## 2025-02-25 PROCEDURE — 99214 OFFICE O/P EST MOD 30 MIN: CPT | Performed by: STUDENT IN AN ORGANIZED HEALTH CARE EDUCATION/TRAINING PROGRAM

## 2025-02-25 PROCEDURE — G8427 DOCREV CUR MEDS BY ELIG CLIN: HCPCS | Performed by: STUDENT IN AN ORGANIZED HEALTH CARE EDUCATION/TRAINING PROGRAM

## 2025-02-25 PROCEDURE — 3046F HEMOGLOBIN A1C LEVEL >9.0%: CPT | Performed by: STUDENT IN AN ORGANIZED HEALTH CARE EDUCATION/TRAINING PROGRAM

## 2025-02-25 PROCEDURE — PBSHW POCT GLYCOSYLATED HEMOGLOBIN (HGB A1C): Performed by: STUDENT IN AN ORGANIZED HEALTH CARE EDUCATION/TRAINING PROGRAM

## 2025-02-25 PROCEDURE — G8420 CALC BMI NORM PARAMETERS: HCPCS | Performed by: STUDENT IN AN ORGANIZED HEALTH CARE EDUCATION/TRAINING PROGRAM

## 2025-02-25 PROCEDURE — G2211 COMPLEX E/M VISIT ADD ON: HCPCS | Performed by: STUDENT IN AN ORGANIZED HEALTH CARE EDUCATION/TRAINING PROGRAM

## 2025-02-25 PROCEDURE — 1036F TOBACCO NON-USER: CPT | Performed by: STUDENT IN AN ORGANIZED HEALTH CARE EDUCATION/TRAINING PROGRAM

## 2025-02-25 PROCEDURE — 83036 HEMOGLOBIN GLYCOSYLATED A1C: CPT | Performed by: STUDENT IN AN ORGANIZED HEALTH CARE EDUCATION/TRAINING PROGRAM

## 2025-02-25 PROCEDURE — 2022F DILAT RTA XM EVC RTNOPTHY: CPT | Performed by: STUDENT IN AN ORGANIZED HEALTH CARE EDUCATION/TRAINING PROGRAM

## 2025-02-25 RX ORDER — SITAGLIPTIN AND METFORMIN HYDROCHLORIDE 1000; 100 MG/1; MG/1
1 TABLET, FILM COATED, EXTENDED RELEASE ORAL DAILY
Qty: 90 TABLET | Refills: 1 | Status: SHIPPED | OUTPATIENT
Start: 2025-02-25

## 2025-02-25 RX ORDER — GLIPIZIDE 5 MG/1
5 TABLET ORAL DAILY
Qty: 90 TABLET | Refills: 1 | Status: SHIPPED | OUTPATIENT
Start: 2025-02-25 | End: 2025-05-26

## 2025-02-25 RX ORDER — METFORMIN HYDROCHLORIDE 500 MG/1
1000 TABLET, EXTENDED RELEASE ORAL
Qty: 180 TABLET | Refills: 1 | Status: SHIPPED | OUTPATIENT
Start: 2025-02-25 | End: 2025-05-26

## 2025-02-25 RX ORDER — GLIPIZIDE 5 MG/1
5 TABLET ORAL
COMMUNITY
End: 2025-02-25 | Stop reason: SDUPTHER

## 2025-02-25 SDOH — ECONOMIC STABILITY: FOOD INSECURITY: WITHIN THE PAST 12 MONTHS, THE FOOD YOU BOUGHT JUST DIDN'T LAST AND YOU DIDN'T HAVE MONEY TO GET MORE.: OFTEN TRUE

## 2025-02-25 SDOH — ECONOMIC STABILITY: FOOD INSECURITY: WITHIN THE PAST 12 MONTHS, YOU WORRIED THAT YOUR FOOD WOULD RUN OUT BEFORE YOU GOT MONEY TO BUY MORE.: OFTEN TRUE

## 2025-02-25 ASSESSMENT — PATIENT HEALTH QUESTIONNAIRE - PHQ9
SUM OF ALL RESPONSES TO PHQ QUESTIONS 1-9: 0
1. LITTLE INTEREST OR PLEASURE IN DOING THINGS: NOT AT ALL
SUM OF ALL RESPONSES TO PHQ QUESTIONS 1-9: 0
2. FEELING DOWN, DEPRESSED OR HOPELESS: NOT AT ALL
SUM OF ALL RESPONSES TO PHQ QUESTIONS 1-9: 0
SUM OF ALL RESPONSES TO PHQ QUESTIONS 1-9: 0
SUM OF ALL RESPONSES TO PHQ9 QUESTIONS 1 & 2: 0

## 2025-02-25 NOTE — PROGRESS NOTES
Subjective  Lizzie Fields, 44 y.o. female presents today with:  Chief Complaint   Patient presents with    Check-Up     Last seen 23. States she did not have insurance for a period of time.     Diabetes     Last A1c was 10.5 on 23. Does not check blood sugars at home. States she has been taking 1 Metformin, once daily & will take 1 Glipizide 5 mg, if she feels her sugar is high.     Hyperlipidemia     Lipid panel done 22.     Other     Follows with psychiatrist, Dr. Hoffmann in [place].        History of Present Illness  The patient is a 44-year-old female who presents for a follow-up visit. She has not been seen since  because of insurance issues.     She has been managing her diabetes with metformin, initially taking four tablets daily but reducing the dosage to two tablets daily due to running out of her prescription. She has not been taking glipizide regularly, only using it when she perceives her blood sugar levels to be elevated. She reports experiencing hypoglycemic episodes with glipizide, which she takes once daily. She expresses a preference for oral medication over injectables such as Ozempic or Mounjaro. She inquires about the possibility of using herbal supplements in conjunction with Januvia. She also questions whether  glipizide is less effective and if she should consult an endocrinologist. She notes that her blood sugar levels tend to be higher during the winter months, which she attributes to decreased physical activity. She was diagnosed with diabetes at the age of 11 and was able to reduce her A1c to 5.7 through daily walking and medication adherence. However, her A1c has since increased due to a lack of physical activity. She reports feeling fatigued and experiencing blurred vision after consuming sugary foods, which resolves within an hour. She does not experience numbness or tingling in her feet.    She has been experiencing vertigo for several years, which she

## 2025-02-25 NOTE — PATIENT INSTRUCTIONS
[place] [employer]*  (Call United Way/211 if need more resources.)    SNAP:  What they offer:  Helps low-income adults and families stretch their monthly food budgets and buy healthy food  Phone Number: 120.355.5935  Website: http://www.GoodData/financial-support-services/food-assistance    Meals on wheels- [facility] office on aging:  What they offer: Home delivered meals, restaurant voucher program, senior food box program and emergency food pantry.   Phone Number:  462.832.8859  Website: https://[employer]Startupsservices/nutrition    [facility] [employer] bank:  What they offer: Will provide a list of available food pantries in the area weekly.   Phone Number: 940.220.8496  Website: https://www.GraphOn/                  [facility] [place]  What they offer: Meals-on-Wheels for those over 60  Phone Number:  445.858.5497  Website: https://[employer]/                      [employer]  What they offer: put in zip code and gives upcoming food distributions in area  Website: https://XYverify/       [facility] [facility]    What they offer: Drive through food pantry and hot meal distribution every Wednesday from 10:00 AM-12:00 PM   Website: https://www.pathwaysec.org      [facility]   What they offer: Packed, to go food pantry items distributed via curbside pickup every Third and Fourth Friday of each month from 1:30 PM to 3:30 PM   Website: https://Anapsisocle.org/programs/foodpantry/kcqolf-khnqby-drbfzeMet[facility] Transportation Resources*  (Call United Way/211 if need more resources.)   [address] [facility]:  What they offer:  Transportation to [facility] resident's, 24 hrs. a day, 7 days a week. You must call for pricing and private pay by credit card.   Phone Number: [phone]  Website: https://www.[facility] Children's HospitalTipser.org/transportationoptions  [employer] Transportation:  What they offer: Personal care service to airports, [employer]

## 2025-02-26 PROBLEM — F31.9 BIPOLAR 1 DISORDER (HCC): Status: ACTIVE | Noted: 2025-02-26

## 2025-04-25 ENCOUNTER — OFFICE VISIT (OUTPATIENT)
Age: 45
End: 2025-04-25

## 2025-04-25 VITALS
DIASTOLIC BLOOD PRESSURE: 72 MMHG | SYSTOLIC BLOOD PRESSURE: 128 MMHG | WEIGHT: 164 LBS | BODY MASS INDEX: 24.86 KG/M2 | HEIGHT: 68 IN

## 2025-04-25 DIAGNOSIS — F31.9 BIPOLAR 1 DISORDER (HCC): ICD-10-CM

## 2025-04-25 DIAGNOSIS — F90.9 ATTENTION DEFICIT HYPERACTIVITY DISORDER (ADHD), UNSPECIFIED ADHD TYPE: ICD-10-CM

## 2025-04-25 DIAGNOSIS — L30.9 DERMATITIS: ICD-10-CM

## 2025-04-25 DIAGNOSIS — E11.9 TYPE 2 DIABETES MELLITUS WITHOUT COMPLICATION, WITHOUT LONG-TERM CURRENT USE OF INSULIN (HCC): Primary | ICD-10-CM

## 2025-04-25 PROCEDURE — 99213 OFFICE O/P EST LOW 20 MIN: CPT | Performed by: STUDENT IN AN ORGANIZED HEALTH CARE EDUCATION/TRAINING PROGRAM

## 2025-04-25 PROCEDURE — 3046F HEMOGLOBIN A1C LEVEL >9.0%: CPT | Performed by: STUDENT IN AN ORGANIZED HEALTH CARE EDUCATION/TRAINING PROGRAM

## 2025-04-25 PROCEDURE — 99214 OFFICE O/P EST MOD 30 MIN: CPT | Performed by: STUDENT IN AN ORGANIZED HEALTH CARE EDUCATION/TRAINING PROGRAM

## 2025-04-25 RX ORDER — TRIAMCINOLONE ACETONIDE 1 MG/G
OINTMENT TOPICAL 2 TIMES DAILY
Qty: 30 G | Refills: 0 | Status: SHIPPED | OUTPATIENT
Start: 2025-04-25 | End: 2025-05-02

## 2025-04-25 RX ORDER — TRIAMCINOLONE ACETONIDE 1 MG/G
OINTMENT TOPICAL 2 TIMES DAILY
Qty: 30 G | Refills: 0 | Status: SHIPPED | OUTPATIENT
Start: 2025-04-25 | End: 2025-04-25

## 2025-04-25 NOTE — PROGRESS NOTES
with supper 180 tablet 1    glipiZIDE (GLUCOTROL) 5 MG tablet Take 1 tablet by mouth daily 90 tablet 1    diazePAM (VALIUM) 5 MG tablet Take 1 tablet by mouth every 6 hours as needed for Anxiety.      amphetamine-dextroamphetamine (ADDERALL, 30MG,) 30 MG tablet Take 1 tablet by mouth 2 times daily for 30 days. 60 tablet 0    lamoTRIgine (LAMICTAL) 150 MG tablet TAKE 1 TABLET BY MOUTH THREE TIMES A DAY 90 tablet 0    buPROPion (WELLBUTRIN XL) 150 MG extended release tablet Take 1 tablet by mouth in the morning and at bedtime       No current facility-administered medications for this visit.     Allergies, PMH, Surgical Hx, Family Hx, and Social Hx reviewed and updated.  Health Maintenance reviewed.    Objective    Vitals:    04/25/25 1347   BP: 128/72   Weight: 74.4 kg (164 lb)   Height: 1.727 m (5' 8\")       Physical Exam  Vitals reviewed.   Constitutional:       Appearance: Normal appearance.   HENT:      Head: Normocephalic and atraumatic.      Nose: Nose normal.      Mouth/Throat:      Mouth: Mucous membranes are moist.   Eyes:      Conjunctiva/sclera: Conjunctivae normal.   Pulmonary:      Effort: Pulmonary effort is normal.   Skin:     General: Skin is warm and dry.      Findings: Rash present.   Neurological:      General: No focal deficit present.      Mental Status: She is alert. Mental status is at baseline.   Psychiatric:         Mood and Affect: Mood normal.         Behavior: Behavior normal.            Assessment & Plan     Assessment & Plan  1. Diabetes Mellitus.  Not optimally controlled.   - Blood glucose levels remain elevated, with a recent reading of nearly 300.  - Currently taking metformin, Janumet, and glipizide.  - Discussed switching from Janumet to Mounjaro injections, pending approval from [facility]. Will continue metformin and glipizide. Start mounjaro 2.5 mg weekly. Discussed side effects.   - Referral made for diabetes education to assist with dietary management. Prescription for

## 2025-05-06 ENCOUNTER — OFFICE VISIT (OUTPATIENT)
Age: 45
End: 2025-05-06
Payer: MEDICAID

## 2025-05-06 VITALS
BODY MASS INDEX: 25.31 KG/M2 | WEIGHT: 167 LBS | HEART RATE: 106 BPM | SYSTOLIC BLOOD PRESSURE: 94 MMHG | OXYGEN SATURATION: 99 % | DIASTOLIC BLOOD PRESSURE: 60 MMHG | HEIGHT: 68 IN | TEMPERATURE: 96.9 F

## 2025-05-06 DIAGNOSIS — Z01.419 WELL WOMAN EXAM WITH ROUTINE GYNECOLOGICAL EXAM: ICD-10-CM

## 2025-05-06 DIAGNOSIS — Z01.419 WELL WOMAN EXAM WITH ROUTINE GYNECOLOGICAL EXAM: Primary | ICD-10-CM

## 2025-05-06 DIAGNOSIS — E11.9 TYPE 2 DIABETES MELLITUS WITHOUT COMPLICATION, WITHOUT LONG-TERM CURRENT USE OF INSULIN (HCC): ICD-10-CM

## 2025-05-06 PROCEDURE — 99396 PREV VISIT EST AGE 40-64: CPT | Performed by: STUDENT IN AN ORGANIZED HEALTH CARE EDUCATION/TRAINING PROGRAM

## 2025-05-06 RX ORDER — SITAGLIPTIN AND METFORMIN HYDROCHLORIDE 1000; 100 MG/1; MG/1
TABLET, FILM COATED, EXTENDED RELEASE ORAL DAILY
COMMUNITY

## 2025-05-06 NOTE — PROGRESS NOTES
Subjective:      Chief Complaint   Patient presents with    Gynecologic Exam     Denies vaginal discharge, odor, pain or itching. Denies concerns for STDs. Denies urinary frequency, urgency, pain or incontinence.        Lizzie Fields is a 44 y.o.female No obstetric history on file. here for routine exam.  Current Complaints: no breast pain or new or enlarging lumps on self exam.    Menstrual history:   no menstrual cycle for last year or so.   Sexual activity:  yes, denies knowledge of risky exposure  Abnormal vaginaldischarge:  No  Contraceptive method:  none    Past Medical History:   Diagnosis Date    ADHD (attention deficit hyperactivity disorder)     Anxiety     Bipolar 1 disorder (HCC)     Congenital heart disease Possibly, need to talk    Depression     Diabetes mellitus (HCC)     Hearing loss vertigo and inner ear infection    HSV-1 (herpes simplex virus 1) infection     Human papilloma virus     Hyperlipidemia     Type 2 diabetes mellitus without complication (HCC)      History reviewed. No pertinent surgical history.  Family History   Problem Relation Age of Onset    Diabetes Mother     Diabetes Father     Heart Disease Father     Ulcerative Colitis Brother     Bipolar Disorder Maternal Grandmother      Social History     Socioeconomic History    Marital status: Single     Spouse name: Not on file    Number of children: Not on file    Years of education: Not on file    Highest education level: Not on file   Occupational History    Not on file   Tobacco Use    Smoking status: Never    Smokeless tobacco: Never   Vaping Use    Vaping status: Some Days    Substances: Always   Substance and Sexual Activity    Alcohol use: Not Currently     Comment: Stopped drinking, except maybe at a party 2 times a month    Drug use: Yes     Types: Marijuana (Weed)     Comment: I smoke before I go to bed. I replaced this instead of alcoh    Sexual activity: Yes     Partners: Male   Other Topics Concern    Not on file   Social

## 2025-05-08 ENCOUNTER — TELEPHONE (OUTPATIENT)
Age: 45
End: 2025-05-08

## 2025-05-09 NOTE — TELEPHONE ENCOUNTER
Note from payer: Coverage is provided when the member meets all the following: Member has a history of at least 120 days of therapy with THREE preferred medications in this UPDL category. ONE of the 120 day trials must be with a drug in the same drug class, Victoza (18 MG/3 ML PEN) (Brand name is preferred by the plan) or Trulicity (0.75 mg, 1.5 mg, 3 mg, and 4.5 mg). Other trials may include but are not limited to: Farxiga 5 and 10 mg (Brand name is preferred by the plan), Glimepiride, Glipizide, Januvia, Jardiance 10 and 25 mg and Metformin  mg, 850 mg, 1000 mg and ER (generics of Glucophage). Member has had an inadequate clinical response (the inability to reach A1C goal (less than 7%) after at least 120 days of current regimen, with use of two or more drugs concomitantly per ADA (American Diabetes Association) guidelines and, Member has documented adherence and appropriate dose escalation (must achieve maximum recommended dose or document that maximum recommended dose is not tolerated or is clinically inappropriate) [facility] Policy for Medical Necessity as posted on the [facility] website and [place] Unified Preferred Drug List criteria were reviewed and per [place] Administrative Code Rule 5160-1-01 (C) and 5160-26-03 (B), a medically necessary service must include: generally accepted standards of medical practice, be clinically appropriate in administration, treatment and outcome and be the lowest cost alternative to effectively treat the condition. Please contact your provider to assist you with other treatment options that might be covered under your benefit package, or other services that might be available through the community.  Payer: [employer] Patient's Payer Case ID: [id number]    [phone]

## 2025-05-12 LAB
HPV HR 12 DNA SPEC QL NAA+PROBE: NOT DETECTED
HPV16 DNA SPEC QL NAA+PROBE: NOT DETECTED
HPV16+18+H RISK 12 DNA SPEC-IMP: NORMAL
HPV18 DNA SPEC QL NAA+PROBE: NOT DETECTED

## 2025-05-14 ENCOUNTER — RESULTS FOLLOW-UP (OUTPATIENT)
Age: 45
End: 2025-05-14

## 2025-06-17 DIAGNOSIS — E11.9 TYPE 2 DIABETES MELLITUS WITHOUT COMPLICATION, WITHOUT LONG-TERM CURRENT USE OF INSULIN (HCC): ICD-10-CM

## 2025-06-17 RX ORDER — SITAGLIPTIN AND METFORMIN HYDROCHLORIDE 1000; 100 MG/1; MG/1
1 TABLET, FILM COATED, EXTENDED RELEASE ORAL DAILY
Qty: 90 TABLET | Refills: 1 | Status: ACTIVE | OUTPATIENT
Start: 2025-06-17

## 2025-06-17 RX ORDER — METFORMIN HYDROCHLORIDE 500 MG/1
1000 TABLET, EXTENDED RELEASE ORAL
Qty: 180 TABLET | Refills: 1 | Status: SHIPPED | OUTPATIENT
Start: 2025-06-17 | End: 2025-09-15

## 2025-06-17 RX ORDER — GLIPIZIDE 5 MG/1
5 TABLET ORAL DAILY
Qty: 90 TABLET | Refills: 1 | Status: SHIPPED | OUTPATIENT
Start: 2025-06-17 | End: 2025-09-15

## 2025-06-17 NOTE — TELEPHONE ENCOUNTER
Patient's Last Office Visit  5/6/2025     Patient's Next Visit  Future Appointments   Date Time Provider Department Center   7/21/2025  4:00 PM Rachael Yanez DO [facility] DEP   8/8/2025  2:30 PM Rachael Yanez DO [facility] DEP

## 2025-07-21 ENCOUNTER — TELEPHONE (OUTPATIENT)
Age: 45
End: 2025-07-21

## 2025-07-21 DIAGNOSIS — E11.9 TYPE 2 DIABETES MELLITUS WITHOUT COMPLICATION, WITHOUT LONG-TERM CURRENT USE OF INSULIN (HCC): Primary | ICD-10-CM

## 2025-07-21 DIAGNOSIS — E78.5 HYPERLIPIDEMIA, UNSPECIFIED HYPERLIPIDEMIA TYPE: ICD-10-CM

## 2025-07-21 NOTE — TELEPHONE ENCOUNTER
----- Message from Jetrob R sent at 7/21/2025 12:25 PM EDT -----  Regarding: E[facility] Message to Provider  ECC Message to Provider    Relationship to Patient: Self     Additional Information Patient have an appointment on August 8, 2025 and she is asking if she need to do blood before the appointment   --------------------------------------------------------------------------------------------------------------------------    Call Back Information: OK to leave message on voicemail  Preferred Call Back Number: Phone:  [phone]

## 2025-07-21 NOTE — TELEPHONE ENCOUNTER
LMOVM informed lab orders were placed & could have done when ready. Informed labs are fasting & to call back w/questions.

## 2025-08-08 ENCOUNTER — TELEPHONE (OUTPATIENT)
Age: 45
End: 2025-08-08